# Patient Record
Sex: MALE | Race: AMERICAN INDIAN OR ALASKA NATIVE
[De-identification: names, ages, dates, MRNs, and addresses within clinical notes are randomized per-mention and may not be internally consistent; named-entity substitution may affect disease eponyms.]

---

## 2017-06-11 ENCOUNTER — HOSPITAL ENCOUNTER (EMERGENCY)
Dept: HOSPITAL 5 - ED | Age: 47
Discharge: LEFT BEFORE DISCHARGE | End: 2017-06-11
Payer: MEDICAID

## 2017-06-11 VITALS — SYSTOLIC BLOOD PRESSURE: 126 MMHG | DIASTOLIC BLOOD PRESSURE: 88 MMHG

## 2017-06-11 DIAGNOSIS — Z53.21: Primary | ICD-10-CM

## 2017-06-11 LAB
ALBUMIN SERPL-MCNC: 4.2 G/DL (ref 3.9–5)
ALBUMIN/GLOB SERPL: 1.4 %
ALP SERPL-CCNC: 88 UNITS/L (ref 35–129)
ALT SERPL-CCNC: 14 UNITS/L (ref 7–56)
ANION GAP SERPL CALC-SCNC: 16 MMOL/L
BASOPHILS NFR BLD AUTO: 0.7 % (ref 0–1.8)
BILIRUB SERPL-MCNC: < 0.2 MG/DL (ref 0.1–1.2)
BILIRUB UR QL STRIP: (no result)
BLOOD UR QL VISUAL: (no result)
BUN SERPL-MCNC: 9 MG/DL (ref 9–20)
BUN/CREAT SERPL: 11.25 %
CALCIUM SERPL-MCNC: 9.4 MG/DL (ref 8.4–10.2)
CHLORIDE SERPL-SCNC: 102.5 MMOL/L (ref 98–107)
CO2 SERPL-SCNC: 28 MMOL/L (ref 22–30)
EOSINOPHIL NFR BLD AUTO: 4.1 % (ref 0–4.3)
GLUCOSE SERPL-MCNC: 88 MG/DL (ref 75–100)
HCT VFR BLD CALC: 39.5 % (ref 35.5–45.6)
HGB BLD-MCNC: 13.1 GM/DL (ref 11.8–15.2)
KETONES UR STRIP-MCNC: (no result) MG/DL
LEUKOCYTE ESTERASE UR QL STRIP: (no result)
LIPASE SERPL-CCNC: 28 UNITS/L (ref 13–60)
MCH RBC QN AUTO: 30 PG (ref 28–32)
MCHC RBC AUTO-ENTMCNC: 33 % (ref 32–34)
MCV RBC AUTO: 91 FL (ref 84–94)
NITRITE UR QL STRIP: (no result)
PH UR STRIP: 6 [PH] (ref 5–7)
PLATELET # BLD: 254 K/MM3 (ref 140–440)
POTASSIUM SERPL-SCNC: 4.9 MMOL/L (ref 3.6–5)
PROT SERPL-MCNC: 7.2 G/DL (ref 6.3–8.2)
PROT UR STRIP-MCNC: (no result) MG/DL
RBC # BLD AUTO: 4.35 M/MM3 (ref 3.65–5.03)
RBC #/AREA URNS HPF: 4 /HPF (ref 0–6)
SODIUM SERPL-SCNC: 142 MMOL/L (ref 137–145)
URINE DRUGS OF ABUSE NOTE: (no result)
UROBILINOGEN UR-MCNC: < 2 MG/DL (ref ?–2)
WBC # BLD AUTO: 7.9 K/MM3 (ref 4.5–11)
WBC #/AREA URNS HPF: 1 /HPF (ref 0–6)

## 2017-06-11 PROCEDURE — 80053 COMPREHEN METABOLIC PANEL: CPT

## 2017-06-11 PROCEDURE — 36415 COLL VENOUS BLD VENIPUNCTURE: CPT

## 2017-06-11 PROCEDURE — 85025 COMPLETE CBC W/AUTO DIFF WBC: CPT

## 2017-06-11 PROCEDURE — 83690 ASSAY OF LIPASE: CPT

## 2017-06-11 PROCEDURE — 81001 URINALYSIS AUTO W/SCOPE: CPT

## 2017-06-11 PROCEDURE — 80320 DRUG SCREEN QUANTALCOHOLS: CPT

## 2017-06-11 PROCEDURE — G0480 DRUG TEST DEF 1-7 CLASSES: HCPCS

## 2017-06-11 PROCEDURE — 80307 DRUG TEST PRSMV CHEM ANLYZR: CPT

## 2017-08-20 ENCOUNTER — HOSPITAL ENCOUNTER (EMERGENCY)
Dept: HOSPITAL 5 - ED | Age: 47
Discharge: HOME | End: 2017-08-20
Payer: MEDICAID

## 2017-08-20 VITALS — SYSTOLIC BLOOD PRESSURE: 115 MMHG | DIASTOLIC BLOOD PRESSURE: 58 MMHG
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
## 2017-08-20 DIAGNOSIS — S02.609A: Primary | ICD-10-CM
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
## 2017-08-20 DIAGNOSIS — F17.200: ICD-10-CM
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
## 2017-08-20 PROCEDURE — 99283 EMERGENCY DEPT VISIT LOW MDM: CPT

## 2017-08-20 PROCEDURE — 70450 CT HEAD/BRAIN W/O DYE: CPT

## 2017-08-20 PROCEDURE — 70486 CT MAXILLOFACIAL W/O DYE: CPT

## 2017-08-20 PROCEDURE — 96372 THER/PROPH/DIAG INJ SC/IM: CPT
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
## 2018-01-21 ENCOUNTER — HOSPITAL ENCOUNTER (INPATIENT)
Dept: HOSPITAL 5 - ED | Age: 48
LOS: 4 days | Discharge: HOME | DRG: 855 | End: 2018-01-25
Attending: INTERNAL MEDICINE | Admitting: INTERNAL MEDICINE
Payer: SELF-PAY

## 2018-01-21 DIAGNOSIS — Z72.89: ICD-10-CM

## 2018-01-21 DIAGNOSIS — N48.21: ICD-10-CM

## 2018-01-21 DIAGNOSIS — Z79.2: ICD-10-CM

## 2018-01-21 DIAGNOSIS — A41.9: Primary | ICD-10-CM

## 2018-01-21 DIAGNOSIS — N48.22: ICD-10-CM

## 2018-01-21 DIAGNOSIS — Z82.49: ICD-10-CM

## 2018-01-21 DIAGNOSIS — Z88.0: ICD-10-CM

## 2018-01-21 DIAGNOSIS — F15.90: ICD-10-CM

## 2018-01-21 DIAGNOSIS — F17.200: ICD-10-CM

## 2018-01-21 DIAGNOSIS — Z79.899: ICD-10-CM

## 2018-01-21 LAB
BASOPHILS # (AUTO): 0.1 K/MM3 (ref 0–0.1)
BASOPHILS NFR BLD AUTO: 0.4 % (ref 0–1.8)
BILIRUB UR QL STRIP: (no result)
BLOOD UR QL VISUAL: (no result)
BUN SERPL-MCNC: 12 MG/DL (ref 9–20)
BUN/CREAT SERPL: 13 %
CALCIUM SERPL-MCNC: 9.5 MG/DL (ref 8.4–10.2)
EOSINOPHIL # BLD AUTO: 0.1 K/MM3 (ref 0–0.4)
EOSINOPHIL NFR BLD AUTO: 0.6 % (ref 0–4.3)
HCT VFR BLD CALC: 42 % (ref 35.5–45.6)
HEMOLYSIS INDEX: 12
HGB BLD-MCNC: 13.8 GM/DL (ref 11.8–15.2)
LYMPHOCYTES # BLD AUTO: 1.4 K/MM3 (ref 1.2–5.4)
LYMPHOCYTES NFR BLD AUTO: 9.6 % (ref 13.4–35)
MCH RBC QN AUTO: 30 PG (ref 28–32)
MCHC RBC AUTO-ENTMCNC: 33 % (ref 32–34)
MCV RBC AUTO: 91 FL (ref 84–94)
MONOCYTES # (AUTO): 1.3 K/MM3 (ref 0–0.8)
MONOCYTES % (AUTO): 8.6 % (ref 0–7.3)
MUCOUS THREADS #/AREA URNS HPF: (no result) /HPF
NITRITE UR QL STRIP: (no result)
PH UR STRIP: 7 [PH] (ref 5–7)
PLATELET # BLD: 270 K/MM3 (ref 140–440)
RBC # BLD AUTO: 4.61 M/MM3 (ref 3.65–5.03)
RBC #/AREA URNS HPF: 20 /HPF (ref 0–6)
UROBILINOGEN UR-MCNC: 4 MG/DL (ref ?–2)
WBC #/AREA URNS HPF: 5 /HPF (ref 0–6)

## 2018-01-21 PROCEDURE — 80048 BASIC METABOLIC PNL TOTAL CA: CPT

## 2018-01-21 PROCEDURE — 87116 MYCOBACTERIA CULTURE: CPT

## 2018-01-21 PROCEDURE — 96365 THER/PROPH/DIAG IV INF INIT: CPT

## 2018-01-21 PROCEDURE — 86695 HERPES SIMPLEX TYPE 1 TEST: CPT

## 2018-01-21 PROCEDURE — 87075 CULTR BACTERIA EXCEPT BLOOD: CPT

## 2018-01-21 PROCEDURE — 93005 ELECTROCARDIOGRAM TRACING: CPT

## 2018-01-21 PROCEDURE — 36415 COLL VENOUS BLD VENIPUNCTURE: CPT

## 2018-01-21 PROCEDURE — 96375 TX/PRO/DX INJ NEW DRUG ADDON: CPT

## 2018-01-21 PROCEDURE — 86403 PARTICLE AGGLUT ANTBDY SCRN: CPT

## 2018-01-21 PROCEDURE — 87040 BLOOD CULTURE FOR BACTERIA: CPT

## 2018-01-21 PROCEDURE — 85027 COMPLETE CBC AUTOMATED: CPT

## 2018-01-21 PROCEDURE — 87086 URINE CULTURE/COLONY COUNT: CPT

## 2018-01-21 PROCEDURE — 87400 INFLUENZA A/B EACH AG IA: CPT

## 2018-01-21 PROCEDURE — 88305 TISSUE EXAM BY PATHOLOGIST: CPT

## 2018-01-21 PROCEDURE — 74177 CT ABD & PELVIS W/CONTRAST: CPT

## 2018-01-21 PROCEDURE — 96361 HYDRATE IV INFUSION ADD-ON: CPT

## 2018-01-21 PROCEDURE — 82140 ASSAY OF AMMONIA: CPT

## 2018-01-21 PROCEDURE — 86592 SYPHILIS TEST NON-TREP QUAL: CPT

## 2018-01-21 PROCEDURE — 87186 SC STD MICRODIL/AGAR DIL: CPT

## 2018-01-21 PROCEDURE — 93975 VASCULAR STUDY: CPT

## 2018-01-21 PROCEDURE — 86140 C-REACTIVE PROTEIN: CPT

## 2018-01-21 PROCEDURE — 87806 HIV AG W/HIV1&2 ANTB W/OPTIC: CPT

## 2018-01-21 PROCEDURE — 82962 GLUCOSE BLOOD TEST: CPT

## 2018-01-21 PROCEDURE — 71045 X-RAY EXAM CHEST 1 VIEW: CPT

## 2018-01-21 PROCEDURE — 86696 HERPES SIMPLEX TYPE 2 TEST: CPT

## 2018-01-21 PROCEDURE — 96366 THER/PROPH/DIAG IV INF ADDON: CPT

## 2018-01-21 PROCEDURE — 81001 URINALYSIS AUTO W/SCOPE: CPT

## 2018-01-21 PROCEDURE — 93010 ELECTROCARDIOGRAM REPORT: CPT

## 2018-01-21 PROCEDURE — 80202 ASSAY OF VANCOMYCIN: CPT

## 2018-01-21 PROCEDURE — 72195 MRI PELVIS W/O DYE: CPT

## 2018-01-21 PROCEDURE — 85025 COMPLETE CBC W/AUTO DIFF WBC: CPT

## 2018-01-21 RX ADMIN — VANCOMYCIN HYDROCHLORIDE SCH MLS/HR: 100 INJECTION, POWDER, LYOPHILIZED, FOR SOLUTION INTRAVENOUS at 22:57

## 2018-01-21 RX ADMIN — OSELTAMIVIR PHOSPHATE SCH MG: 75 CAPSULE ORAL at 23:16

## 2018-01-21 NOTE — ULTRASOUND REPORT
FINAL REPORT



EXAM:  US TESTICULAR DOPPLER COMP



HISTORY:  testicular pain 



TECHNIQUE:  Ultrasound of scrotum



PRIORS:  None.



FINDINGS:  

Examination of the testicles demonstrates both to be normal in

size and normal and homogeneous in echogenicity with normal blood

flow bilaterally. No focal abnormality is noted in either

testicle. The right testicle measures 3.9 x 2.3 x 2.9 cm and the

left measures 4.1 x 1.5 x 2.6 cm.



Both epididymides appear normal in size and echogenicity with

normal blood flow. There is a cyst in the head of the right

epididymis measuring 1.2 cm. No evidence for hydroceles or

varicoceles are present bilaterally.



IMPRESSION:  

Small cyst in the head of the right epididymis. Otherwise, normal

testicular ultrasound

## 2018-01-21 NOTE — HISTORY AND PHYSICAL REPORT
History of Present Illness


Date of examination: 01/21/18


History of present illness: 


47-year-old man with no medical problems comes emergency room because he stated 

he developed a pimple on the penis 2 days ago, he picked it and has now 

developed pain,swelling,  fever, chills.  He also complained of body ache


Review Of  Systems:


Constitutional: no weight loss


Ears, eyes, nose, mouth and throat: no nasal congestion, no nasal discharge, no 

sinus pressure, blurry vision, diplopia


Neck: No neck pain or rigidity.


Cardiovascular: No chest pain,  palpitations


Respiratory: No shortness of breath, cough


Gastrointestinal: No abdominal pain, hematochezia


Genitourinary : no dysuria, frequency , hematuria


Musculoskeletal: no muscle ache 


Integumentary: no rash, no pruritis


Neurological: no parathesias, focal weakness


Endocrine: no cold or heat intolerance, no polyuria or polydipsia


Hematologic/Lymphatic: no easy bruising, no easy bleeding, no gland swelling


Allergic/Immunologic: no urticaria, no angioedema.





PAST MEDICAL HISTORY:none





PAST SURGICAL HISTORY:none





FAMILY HISTORY: Hypertension





SOCIAL HISTORY: Smokes half pack a day, methamphetamine use, positive alcohol 

use














Medications and Allergies


 Allergies











Allergy/AdvReac Type Severity Reaction Status Date / Time


 


Penicillins Allergy  Hives Verified 12/17/13 22:28











 Home Medications











 Medication  Instructions  Recorded  Confirmed  Last Taken  Type


 


Ciprofloxacin HCl [Cipro] 500 mg PO Q12H #14 tab 12/18/13  Unknown Rx


 


HYDROcodone/APAP 5-325 [[place] 1 each PO Q6HR PRN #14 tablet 08/20/17  Unknown Rx





5-325 mg TAB]     


 


Sulfamethoxazole/Trimethoprim 1 each PO BID #14 tablet 08/20/17  Unknown Rx





[Bactrim DS TAB]     











Active Meds: 


Active Medications





Vancomycin HCl 1,500 mg/ (Sodium Chloride)  515 mls @ 257.5 mls/hr IV Q12H JOHN


Oseltamivir Phosphate (Tamiflu)  75 mg PO BID JOHN


   Stop: 01/26/18 10:01


Vancomycin HCl (Vancomycin Pharmacy To Dose)  1 each IV PKCONSULT JOHN


   PRN Reason: Protocol











Exam





- Physical Exam


Narrative exam: 


Gen. appearance: Patient lying in bed in no acute distress


HEENT: Normocephalic/atraumatic, pupils equal round reactive to light, extra 

occular movement intact, no scleral icterus, no JVD or thyromegaly or nodule, 

neck is supple, mucous membrane moist, no erythema or exudate


Heart: S1-S2, regular rate and rhythm


Lungs: Clear to auscultation bilateral breathing comfortable


Abdomen: Positive bowel sounds, nontender, nondistended, no organomegaly


Extremities: No edema, cyanosis, clubbing


Neuro:: Oriented 3 , cranial nerves II-12 intact, speech, motor intact


Skin: No rash, nodules, warm dry








- Constitutional


Vitals: 


 











Temp Pulse Resp BP Pulse Ox


 


 101.7 F H  138 H  18   132/81   99 


 


 01/21/18 18:07  01/21/18 18:07  01/21/18 18:15  01/21/18 18:07  01/21/18 18:07














Results





- Labs


CBC & Chem 7: 


 01/21/18 18:16





 01/21/18 18:16


Labs: 


 Abnormal lab results











  01/21/18 01/21/18 Range/Units





  18:16 18:16 


 


WBC  15.1 H   (4.5-11.0)  K/mm3


 


Lymph % (Auto)  9.6 L   (13.4-35.0)  %


 


Mono % (Auto)  8.6 H   (0.0-7.3)  %


 


Mono #  1.3 H   (0.0-0.8)  K/mm3


 


Seg Neutrophils %  80.8 H   (40.0-70.0)  %


 


Seg Neutrophils #  12.2 H   (1.8-7.7)  K/mm3


 


Sodium   134 L  (137-145)  mmol/L


 


Chloride   95.5 L  ()  mmol/L














- Imaging and Cardiology


CT scan - abdomen: report reviewed


CT scan - pelvis: report reviewed





Assessment and Plan


testicular US reviewed
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
## 2018-01-21 NOTE — CAT SCAN REPORT
FINAL REPORT



PROCEDURE:  CT ABDOMEN PELVIS W CON



TECHNIQUE:  Computerized axial tomography of the abdomen and

pelvis was performed after the IV injection of iodinated nonionic

contrast. 



HISTORY:  Fever/Sepsis 



COMPARISON:  No prior studies are available for comparison.



FINDINGS:  

6 millimeter x 4 millimeter well-defined cystic lesion is noted

involving segment 5 of right lobe liver. There are multiple

additional smaller hypodense lesions measuring 3-4 millimeters

which cannot be further evaluated. Spleen, and adrenal glands are

within normal limits. Bilateral kidneys demonstrate uniform

enhancement without hydronephrosis aorta is of normal caliber.

There is no free fluid or free air. Gallbladder is unremarkable.

Small bowel loops are within normal limits. Mild degree residual

stool is noted. Appendix is partially visualized and is normal. 



IMPRESSION:  

Small cystic lesion right lobe liver. Is no smaller hypodense

lesions are too small to characterize.



Otherwise no acute intra-abdominal or pelvic pathology.

## 2018-01-22 LAB
BUN SERPL-MCNC: 13 MG/DL (ref 9–20)
BUN/CREAT SERPL: 13 %
CALCIUM SERPL-MCNC: 9.3 MG/DL (ref 8.4–10.2)
HCT VFR BLD CALC: 42.5 % (ref 35.5–45.6)
HEMOLYSIS INDEX: 6
HGB BLD-MCNC: 13.8 GM/DL (ref 11.8–15.2)
MCH RBC QN AUTO: 30 PG (ref 28–32)
MCHC RBC AUTO-ENTMCNC: 33 % (ref 32–34)
MCV RBC AUTO: 91 FL (ref 84–94)
PLATELET # BLD: 250 K/MM3 (ref 140–440)
RBC # BLD AUTO: 4.67 M/MM3 (ref 3.65–5.03)

## 2018-01-22 RX ADMIN — ENOXAPARIN SODIUM SCH MG: 100 INJECTION SUBCUTANEOUS at 10:05

## 2018-01-22 RX ADMIN — OXYCODONE AND ACETAMINOPHEN PRN TAB: 5; 325 TABLET ORAL at 10:44

## 2018-01-22 RX ADMIN — NICOTINE SCH MG: 14 PATCH TRANSDERMAL at 12:57

## 2018-01-22 RX ADMIN — OXYCODONE AND ACETAMINOPHEN PRN TAB: 5; 325 TABLET ORAL at 05:04

## 2018-01-22 RX ADMIN — LEVOFLOXACIN SCH MLS/HR: 5 INJECTION, SOLUTION INTRAVENOUS at 10:05

## 2018-01-22 RX ADMIN — VANCOMYCIN HYDROCHLORIDE SCH MLS/HR: 100 INJECTION, POWDER, LYOPHILIZED, FOR SOLUTION INTRAVENOUS at 10:04

## 2018-01-22 RX ADMIN — OSELTAMIVIR PHOSPHATE SCH MG: 75 CAPSULE ORAL at 10:05

## 2018-01-22 RX ADMIN — DOXYCYCLINE HYCLATE SCH MG: 100 CAPSULE ORAL at 23:22

## 2018-01-22 RX ADMIN — VANCOMYCIN HYDROCHLORIDE SCH MLS/HR: 100 INJECTION, POWDER, LYOPHILIZED, FOR SOLUTION INTRAVENOUS at 23:21

## 2018-01-22 RX ADMIN — OXYCODONE AND ACETAMINOPHEN PRN TAB: 5; 325 TABLET ORAL at 20:00

## 2018-01-22 RX ADMIN — OXYCODONE AND ACETAMINOPHEN PRN TAB: 5; 325 TABLET ORAL at 14:07

## 2018-01-22 RX ADMIN — OSELTAMIVIR PHOSPHATE SCH MG: 75 CAPSULE ORAL at 23:22

## 2018-01-22 NOTE — PROGRESS NOTE
Assessment and Plan





?? chancrois or LGV 


on IV abs 


ID consult noted


rec mri and poss drainage  pensing 


all explained 


dictated 





Subjective


Date of service: 01/22/18


Principal diagnosis: penile lesion edema 





Objective





- Constitutional


Vitals: 


 Vital Signs - 12hr











  01/22/18 01/22/18 01/22/18





  08:57 10:44 12:26


 


Temperature 98.4 F  100.4 F H


 


Pulse Rate 83  92 H


 


Respiratory 16 20 18





Rate   


 


Blood Pressure 96/52  94/55


 


O2 Sat by Pulse 97  97





Oximetry   














  01/22/18 01/22/18





  14:07 15:50


 


Temperature  99.0 F


 


Pulse Rate  89


 


Respiratory 20 18





Rate  


 


Blood Pressure  97/54


 


O2 Sat by Pulse  93





Oximetry  











General appearance: Present: mild distress





- Neck


Neck: supple





- Respiratory


Respiratory effort: normal





- Gastrointestinal


General gastrointestinal: Present: soft, non-tender





- Genitourinary


Male genitourinary: right inguinal lymphadenopathy, left inguinal 

lymphadenopathy, asymmetrical





- Labs


CBC & Chem 7: 


 01/22/18 04:00





 01/22/18 04:00


Labs: 


 Abnormal lab results











  01/21/18 01/21/18 01/22/18 Range/Units





  18:16 18:16 04:00 


 


WBC  15.1 H   14.4 H  (4.5-11.0)  K/mm3


 


Lymph % (Auto)  9.6 L    (13.4-35.0)  %


 


Mono % (Auto)  8.6 H    (0.0-7.3)  %


 


Mono #  1.3 H    (0.0-0.8)  K/mm3


 


Seg Neutrophils %  80.8 H    (40.0-70.0)  %


 


Seg Neutrophils #  12.2 H    (1.8-7.7)  K/mm3


 


Sodium   134 L   (137-145)  mmol/L


 


Chloride   95.5 L   ()  mmol/L


 


Glucose     ()  mg/dL


 


POC Glucose     ()  














  01/22/18 01/22/18 Range/Units





  04:00 07:24 


 


WBC    (4.5-11.0)  K/mm3


 


Lymph % (Auto)    (13.4-35.0)  %


 


Mono % (Auto)    (0.0-7.3)  %


 


Mono #    (0.0-0.8)  K/mm3


 


Seg Neutrophils %    (40.0-70.0)  %


 


Seg Neutrophils #    (1.8-7.7)  K/mm3


 


Sodium    (137-145)  mmol/L


 


Chloride  95.0 L   ()  mmol/L


 


Glucose  31 L*   ()  mg/dL


 


POC Glucose   113 H  ()

## 2018-01-22 NOTE — PROGRESS NOTE
<SHARONA NI - Last Filed: 01/22/18 14:21>





Assessment and Plan


Assessment and plan: 





This is a 47-year-old male who was previously on known to this provider, patient

's does not have a local primary care doctor, and endorses a past history of IV 

methamphetamine abuse within the past month.  Patient reports that a few days 

ago he had a sore on the dorsal aspect of his penis, and "picked it off."  

Shortly thereafter developed diffuse penile pain and swelling, left-sided 

testicular pain, right lower quadrant pain and right lower back pain.  He 

endorses fevers, chills and body aches.  He thinks that he has the flu.  He 

denies severe headache, neck pain, chest pain, shortness of breath, irritative 

urinary symptoms.  His pain is constant, does not radiate anywhere, it 

increases with palpation and decreases with rest











- Patient Problems


(1) Penile cellulitis


Current Visit: Yes   Status: Acute   


Plan to address problem: 


Urology consulted, continue abx








(2) Sepsis affecting skin


Current Visit: Yes   Status: Acute   


Plan to address problem: 


Improving, continue antibiotics, ID consulted








(3) Current nicotine use


Current Visit: Yes   Status: Chronic   


Plan to address problem: 


Patient counseled on cessation, nicotine patch ordered








(4) DVT prophylaxis


Current Visit: Yes   Status: Acute   


Plan to address problem: 


Lovenox








History


Interval history: 





Patient was seen and examined.  He continues to complain of penile pain.  

Nursing notes, labs and imaging reviewed.





Hospitalist Physical





- Physical exam


Narrative exam: 





the penile shaft is diffusely swollen, tender and indurated. 





- Constitutional


Vitals: 


 











Temp Pulse Resp BP Pulse Ox


 


 98.5 F   92 H  20   92/51   96 


 


 01/22/18 03:59  01/22/18 03:59  01/22/18 03:59  01/22/18 03:59  01/22/18 03:59











General appearance: Present: mild distress, well-nourished





- EENT


Eyes: Present: PERRL, EOM intact


ENT: hearing intact, clear oral mucosa





- Neck


Neck: Present: supple, normal ROM





- Respiratory


Respiratory effort: normal


Respiratory: bilateral: CTA





- Cardiovascular


Rhythm: regular


Heart Sounds: Present: S1 & S2





- Extremities


Extremities: no ischemia, No edema





- Abdominal


General gastrointestinal: soft, non-tender, non-distended





- Integumentary


Integumentary: Present: clear, warm, dry





- Psychiatric


Psychiatric: appropriate mood/affect, cooperative





- Neurologic


Neurologic: CNII-XII intact, moves all extremities





- Allied Health


Allied health notes reviewed: nursing





Results





- Labs


CBC & Chem 7: 


 01/22/18 04:00





 01/22/18 04:00


Labs: 


 Laboratory Last Values











WBC  14.4 K/mm3 (4.5-11.0)  H  01/22/18  04:00    


 


RBC  4.67 M/mm3 (3.65-5.03)   01/22/18  04:00    


 


Hgb  13.8 gm/dl (11.8-15.2)   01/22/18  04:00    


 


Hct  42.5 % (35.5-45.6)   01/22/18  04:00    


 


MCV  91 fl (84-94)   01/22/18  04:00    


 


MCH  30 pg (28-32)   01/22/18  04:00    


 


MCHC  33 % (32-34)   01/22/18  04:00    


 


RDW  14.1 % (13.2-15.2)   01/22/18  04:00    


 


Plt Count  250 K/mm3 (140-440)   01/22/18  04:00    


 


Lymph % (Auto)  Np   01/22/18  04:00    


 


Mono % (Auto)  Np   01/22/18  04:00    


 


Eos % (Auto)  Np   01/22/18  04:00    


 


Baso % (Auto)  Np   01/22/18  04:00    


 


Lymph #  Np   01/22/18  04:00    


 


Mono #  Np   01/22/18  04:00    


 


Eos #  Np   01/22/18  04:00    


 


Baso #  Np   01/22/18  04:00    


 


Seg Neutrophils %  Np   01/22/18  04:00    


 


Seg Neutrophils #  Np   01/22/18  04:00    


 


Sodium  138 mmol/L (137-145)   01/22/18  04:00    


 


Potassium  3.9 mmol/L (3.6-5.0)   01/22/18  04:00    


 


Chloride  95.0 mmol/L ()  L  01/22/18  04:00    


 


Carbon Dioxide  23 mmol/L (22-30)   01/22/18  04:00    


 


Anion Gap  24 mmol/L  01/22/18  04:00    


 


BUN  13 mg/dL (9-20)   01/22/18  04:00    


 


Creatinine  1.0 mg/dL (0.8-1.5)   01/22/18  04:00    


 


Estimated GFR  > 60 ml/min  01/22/18  04:00    


 


BUN/Creatinine Ratio  13 %  01/22/18  04:00    


 


Glucose  31 mg/dL ()  L*  01/22/18  04:00    


 


POC Glucose  113  ()  H  01/22/18  07:24    


 


Lactic Acid  0.90 mmol/L (0.7-2.0)   01/21/18  23:48    


 


Calcium  9.3 mg/dL (8.4-10.2)   01/22/18  04:00    


 


Urine Color  Yellow  (Yellow)   01/21/18  18:18    


 


Urine Turbidity  Clear  (Clear)   01/21/18  18:18    


 


Urine pH  7.0  (5.0-7.0)   01/21/18  18:18    


 


Ur Specific Gravity  1.024  (1.003-1.030)   01/21/18  18:18    


 


Urine Protein  30 mg/dl mg/dL (Negative)   01/21/18  18:18    


 


Urine Glucose (UA)  Neg mg/dL (Negative)   01/21/18  18:18    


 


Urine Ketones  20 mg/dL (Negative)   01/21/18  18:18    


 


Urine Blood  Neg  (Negative)   01/21/18  18:18    


 


Urine Nitrite  Neg  (Negative)   01/21/18  18:18    


 


Urine Bilirubin  Neg  (Negative)   01/21/18  18:18    


 


Urine Urobilinogen  4.0 mg/dL (<2.0)   01/21/18  18:18    


 


Ur Leukocyte Esterase  Neg  (Negative)   01/21/18  18:18    


 


Urine WBC (Auto)  5.0 /HPF (0.0-6.0)   01/21/18  18:18    


 


Urine RBC (Auto)  20.0 /HPF (0.0-6.0)   01/21/18  18:18    


 


U Epithel Cells (Auto)  1.0 /HPF (0-13.0)   01/21/18  18:18    


 


Urine Mucus  Few /HPF  01/21/18  18:18    














<NATASHA PINEDO - Last Filed: 01/22/18 15:32>





Assessment and Plan


Assessment and plan: 





I saw and evaluated the patient. I agree with the findings and the plan of care 

as documented in the Nurse Practitioner's~note, with the following corrections 

and additions.


Patient with penile edema, cellulitis. Continue antibiotics. Consult Urology 

and ID Physician.








Hospitalist Physical





- Constitutional


Vitals: 


 











Temp Pulse Resp BP Pulse Ox


 


 98.4 F   83   20   96/52   97 


 


 01/22/18 08:57  01/22/18 08:57  01/22/18 14:07  01/22/18 08:57  01/22/18 08:57














Results





- Labs


CBC & Chem 7: 


 01/22/18 04:00





 01/22/18 04:00


Labs: 


 Laboratory Last Values











WBC  14.4 K/mm3 (4.5-11.0)  H  01/22/18  04:00    


 


RBC  4.67 M/mm3 (3.65-5.03)   01/22/18  04:00    


 


Hgb  13.8 gm/dl (11.8-15.2)   01/22/18  04:00    


 


Hct  42.5 % (35.5-45.6)   01/22/18  04:00    


 


MCV  91 fl (84-94)   01/22/18  04:00    


 


MCH  30 pg (28-32)   01/22/18  04:00    


 


MCHC  33 % (32-34)   01/22/18  04:00    


 


RDW  14.1 % (13.2-15.2)   01/22/18  04:00    


 


Plt Count  250 K/mm3 (140-440)   01/22/18  04:00    


 


Lymph % (Auto)  Np   01/22/18  04:00    


 


Mono % (Auto)  Np   01/22/18  04:00    


 


Eos % (Auto)  Np   01/22/18  04:00    


 


Baso % (Auto)  Np   01/22/18  04:00    


 


Lymph #  Np   01/22/18  04:00    


 


Mono #  Np   01/22/18  04:00    


 


Eos #  Np   01/22/18  04:00    


 


Baso #  Np   01/22/18  04:00    


 


Seg Neutrophils %  Np   01/22/18  04:00    


 


Seg Neutrophils #  Np   01/22/18  04:00    


 


Sodium  138 mmol/L (137-145)   01/22/18  04:00    


 


Potassium  3.9 mmol/L (3.6-5.0)   01/22/18  04:00    


 


Chloride  95.0 mmol/L ()  L  01/22/18  04:00    


 


Carbon Dioxide  23 mmol/L (22-30)   01/22/18  04:00    


 


Anion Gap  24 mmol/L  01/22/18  04:00    


 


BUN  13 mg/dL (9-20)   01/22/18  04:00    


 


Creatinine  1.0 mg/dL (0.8-1.5)   01/22/18  04:00    


 


Estimated GFR  > 60 ml/min  01/22/18  04:00    


 


BUN/Creatinine Ratio  13 %  01/22/18  04:00    


 


Glucose  31 mg/dL ()  L*  01/22/18  04:00    


 


POC Glucose  113  ()  H  01/22/18  07:24    


 


Lactic Acid  0.90 mmol/L (0.7-2.0)   01/21/18  23:48    


 


Calcium  9.3 mg/dL (8.4-10.2)   01/22/18  04:00    


 


Urine Color  Yellow  (Yellow)   01/21/18  18:18    


 


Urine Turbidity  Clear  (Clear)   01/21/18  18:18    


 


Urine pH  7.0  (5.0-7.0)   01/21/18  18:18    


 


Ur Specific Gravity  1.024  (1.003-1.030)   01/21/18  18:18    


 


Urine Protein  30 mg/dl mg/dL (Negative)   01/21/18  18:18    


 


Urine Glucose (UA)  Neg mg/dL (Negative)   01/21/18  18:18    


 


Urine Ketones  20 mg/dL (Negative)   01/21/18  18:18    


 


Urine Blood  Neg  (Negative)   01/21/18  18:18    


 


Urine Nitrite  Neg  (Negative)   01/21/18  18:18    


 


Urine Bilirubin  Neg  (Negative)   01/21/18  18:18    


 


Urine Urobilinogen  4.0 mg/dL (<2.0)   01/21/18  18:18    


 


Ur Leukocyte Esterase  Neg  (Negative)   01/21/18  18:18    


 


Urine WBC (Auto)  5.0 /HPF (0.0-6.0)   01/21/18  18:18    


 


Urine RBC (Auto)  20.0 /HPF (0.0-6.0)   01/21/18  18:18    


 


U Epithel Cells (Auto)  1.0 /HPF (0-13.0)   01/21/18  18:18    


 


Urine Mucus  Few /HPF  01/21/18  18:18

## 2018-01-22 NOTE — CONSULTATION
History of Present Illness





- Reason for Consult


Consult date: 01/22/18


sepsis


Requesting physician: SHARONA ARMAS





- History of Present Illness


47 years old male with history of IV methamphetamine abuse within the past month

; admitted on 01/21/2019 due to 48 hours history of severe penile swelling, 

tenderness and erythema.  Patient reports that today before admission, he noted 

a bump on the dorsal aspect of his penile shaft.  Denies any recent trauma or 

injury.  He is sexually active with 2 females, condom and uses is consistent.  

He reports that the same day he started to picked on the pump to make it 

drained.  He did not get any purulence out.  However, 12 hours later he noted 

increased erythema and edema and tenderness of the area.  She also noted nausea 

and malaise with chills.  Denies vomiting or diarrhea. He also noted severe 

frontal headache. No cough. 





In the emergency room, initial temperature was 101.7, heart rate 138, 

respiration 14, O2 sat 99, blood pressure 132/81.  Initial white count 15.1.  

Hemoglobin 13.8.  Platelets 270.  Creatinine is 0.9.  Lactic acid 0.9.  

Urinalysis was negative.





Microbiology:





Blood cultures:


1/21 ngtd





Influenza: neg





Current Antimicrobials:


Tamiflu


Levaquin


Vancomycin


 


Previous Antimicrobials:








 





Past History


Past Surgical History: No surgical history


Social history: no significant social history, single, smoking, IV drug use


Family history: no significant family history





Medications and Allergies


 Allergies











Allergy/AdvReac Type Severity Reaction Status Date / Time


 


Penicillins Allergy  Hives Verified 12/17/13 22:28











 Home Medications











 Medication  Instructions  Recorded  Confirmed  Last Taken  Type


 


No Known Home Medications [No  01/22/18 01/22/18 Unknown History





Reported Home Medications]     











Active Meds: 


Active Medications





Acetaminophen (Tylenol)  650 mg PO Q4H PRN


   PRN Reason: Pain MILD(1-3)/Fever >100.5/HA


Bisacodyl (Dulcolax)  10 mg ID QDAY PRN


   PRN Reason: Constipation unrelieved by MOM


Enoxaparin Sodium (Lovenox)  40 mg SUB-Q QDAY@1000 [facility]


   Last Admin: 01/22/18 10:05 Dose:  40 mg


Vancomycin HCl 1,500 mg/ (Sodium Chloride)  515 mls @ 257.5 mls/hr IV Q12H [facility]


   Last Admin: 01/22/18 10:04 Dose:  257.5 mls/hr


Sodium Chloride (Nacl 0.9% 1000 Ml)  1,000 mls @ 150 mls/hr IV AS DIRECT JOHN


Levofloxacin/Dextrose (Levaquin 750mg/150ml)  750 mg in 150 mls @ 100 mls/hr IV 

Q24HR JOHN


   PRN Reason: Protocol


   Last Admin: 01/22/18 10:05 Dose:  100 mls/hr


Magnesium Hydroxide (Milk Of Magnesia)  30 ml PO Q4H PRN


   PRN Reason: Constipation


Nicotine (Habitrol)  14 mg TD QDAY [facility]


   Last Admin: 01/22/18 12:57 Dose:  14 mg


Ondansetron HCl (Zofran)  4 mg IV Q4H PRN


   PRN Reason: N/V unrelieved by Reglan


   Last Admin: 01/21/18 23:16 Dose:  4 mg


Oseltamivir Phosphate (Tamiflu)  75 mg PO BID [facility]


   Stop: 01/26/18 10:01


   Last Admin: 01/22/18 10:05 Dose:  75 mg


Oxycodone/Acetaminophen (Percocet 5/325)  1 tab PO Q4H PRN


   PRN Reason: Pain, Moderate (4-6)


   Last Admin: 01/22/18 14:07 Dose:  1 tab


Vancomycin HCl (Vancomycin Pharmacy To Dose)  1 each IV PKCONSULT JOHN


   PRN Reason: Protocol











Review of Systems


All systems: negative (as per HPI rest neg)





Physical Examination





- Physical Exam


Narrative exam: 


Assessment: 


1) Sepsis: Present on admission, manifested by fever, tachycardia,  

leukocytosis.  Etiology most likely penile cellulitis / abscess, less likely 

influenza


2) Penile cellulitis / early abscess: initial lesion unclear etiology ? STD 

versus folliculitis


3) IV amphetamine user 





Plan:


-Urology eval


-follow-up blood cultures 


-obtain C-reactive protein (CRP)


-check influenza antigen PCR in nasopharinx 


-continue vanco, zosyn and tamiflu


-check HIV, RPR, GC and chlamydia 


-add doxycycline 





Thank you MIKAELA Armas for your consultation, will follow up with you. 





Cathy Handy MD


Infectious Diseases Specialist


[facility] ([facility])


M [phone]


O [phone]











- Constitutional


Vitals: 


 Vital Signs











Temp Pulse Resp BP Pulse Ox


 


 99.0 F   89   18   97/54   93 


 


 01/22/18 15:50  01/22/18 15:50  01/22/18 15:50  01/22/18 15:50  01/22/18 15:50








 Temperature -Last 24 Hours











Temperature                    99.0 F


 


Temperature                    100.4 F


 


Temperature                    98.4 F


 


Temperature                    98.5 F


 


Temperature                    102.2 F


 


Temperature                    101.7 F

















Results





- Labs


CBC & Chem 7: 


 01/22/18 04:00





 01/22/18 04:00


Labs: 


 Abnormal lab results











  01/21/18 01/21/18 01/22/18 Range/Units





  18:16 18:16 04:00 


 


WBC  15.1 H   14.4 H  (4.5-11.0)  K/mm3


 


Lymph % (Auto)  9.6 L    (13.4-35.0)  %


 


Mono % (Auto)  8.6 H    (0.0-7.3)  %


 


Mono #  1.3 H    (0.0-0.8)  K/mm3


 


Seg Neutrophils %  80.8 H    (40.0-70.0)  %


 


Seg Neutrophils #  12.2 H    (1.8-7.7)  K/mm3


 


Sodium   134 L   (137-145)  mmol/L


 


Chloride   95.5 L   ()  mmol/L


 


Glucose     ()  mg/dL


 


POC Glucose     ()  














  01/22/18 01/22/18 Range/Units





  04:00 07:24 


 


WBC    (4.5-11.0)  K/mm3


 


Lymph % (Auto)    (13.4-35.0)  %


 


Mono % (Auto)    (0.0-7.3)  %


 


Mono #    (0.0-0.8)  K/mm3


 


Seg Neutrophils %    (40.0-70.0)  %


 


Seg Neutrophils #    (1.8-7.7)  K/mm3


 


Sodium    (137-145)  mmol/L


 


Chloride  95.0 L   ()  mmol/L


 


Glucose  31 L*   ()  mg/dL


 


POC Glucose   113 H  ()

## 2018-01-23 LAB
BUN SERPL-MCNC: 6 MG/DL (ref 9–20)
BUN/CREAT SERPL: 8 %
CALCIUM SERPL-MCNC: 8.5 MG/DL (ref 8.4–10.2)
HCT VFR BLD CALC: 39 % (ref 35.5–45.6)
HEMOLYSIS INDEX: 0
HGB BLD-MCNC: 12.5 GM/DL (ref 11.8–15.2)
MCH RBC QN AUTO: 31 PG (ref 28–32)
MCHC RBC AUTO-ENTMCNC: 34 % (ref 32–34)
MCV RBC AUTO: 90 FL (ref 84–94)
PLATELET # BLD: 208 K/MM3 (ref 140–440)
RBC # BLD AUTO: 4.01 M/MM3 (ref 3.65–5.03)

## 2018-01-23 RX ADMIN — ACYCLOVIR SODIUM SCH MLS/HR: 500 INJECTION, SOLUTION INTRAVENOUS at 21:02

## 2018-01-23 RX ADMIN — OXYCODONE AND ACETAMINOPHEN PRN TAB: 5; 325 TABLET ORAL at 07:14

## 2018-01-23 RX ADMIN — OSELTAMIVIR PHOSPHATE SCH MG: 75 CAPSULE ORAL at 12:12

## 2018-01-23 RX ADMIN — VANCOMYCIN HYDROCHLORIDE SCH MLS/HR: 100 INJECTION, POWDER, LYOPHILIZED, FOR SOLUTION INTRAVENOUS at 11:56

## 2018-01-23 RX ADMIN — NICOTINE SCH MG: 14 PATCH TRANSDERMAL at 12:12

## 2018-01-23 RX ADMIN — OXYCODONE AND ACETAMINOPHEN PRN TAB: 5; 325 TABLET ORAL at 02:49

## 2018-01-23 RX ADMIN — DOXYCYCLINE HYCLATE SCH MG: 100 CAPSULE ORAL at 21:02

## 2018-01-23 RX ADMIN — DOXYCYCLINE HYCLATE SCH MG: 100 CAPSULE ORAL at 12:13

## 2018-01-23 RX ADMIN — MORPHINE SULFATE PRN MG: 2 INJECTION, SOLUTION INTRAMUSCULAR; INTRAVENOUS at 21:01

## 2018-01-23 RX ADMIN — LEVOFLOXACIN SCH MLS/HR: 5 INJECTION, SOLUTION INTRAVENOUS at 12:05

## 2018-01-23 RX ADMIN — MORPHINE SULFATE PRN MG: 2 INJECTION, SOLUTION INTRAMUSCULAR; INTRAVENOUS at 14:54

## 2018-01-23 RX ADMIN — ACYCLOVIR SODIUM SCH MLS/HR: 500 INJECTION, SOLUTION INTRAVENOUS at 12:13

## 2018-01-23 RX ADMIN — ENOXAPARIN SODIUM SCH MG: 100 INJECTION SUBCUTANEOUS at 12:13

## 2018-01-23 RX ADMIN — SODIUM CHLORIDE, SODIUM LACTATE, POTASSIUM CHLORIDE, AND CALCIUM CHLORIDE SCH MLS/HR: .6; .31; .03; .02 INJECTION, SOLUTION INTRAVENOUS at 18:02

## 2018-01-23 RX ADMIN — VANCOMYCIN HYDROCHLORIDE SCH MLS/HR: 100 INJECTION, POWDER, LYOPHILIZED, FOR SOLUTION INTRAVENOUS at 23:18

## 2018-01-23 RX ADMIN — SODIUM CHLORIDE, SODIUM LACTATE, POTASSIUM CHLORIDE, AND CALCIUM CHLORIDE SCH MLS/HR: .6; .31; .03; .02 INJECTION, SOLUTION INTRAVENOUS at 16:50

## 2018-01-23 RX ADMIN — OXYCODONE AND ACETAMINOPHEN PRN TAB: 5; 325 TABLET ORAL at 18:02

## 2018-01-23 NOTE — CONSULTATION
HISTORY OF PRESENT ILLNESS:  The patient is a 47-year-old gentleman with history

of IV amphetamine abuse who has a severe swelling in the dorsum of his penis and

induration.  He has some significant adenopathy.  He has at least 2 partners

sexually.  He denies any recent venereal disease, but the swelling is bad and

tender.  He has been circumcised in the past.



PAST MEDICAL HISTORY:  As mentioned above.



PAST SURGICAL HISTORY:  Facial surgery.



SOCIAL HISTORY:  History of drug use.



MEDICATIONS:  Bactrim.



ALLERGIES:  PENICILLIN.



REVIEW OF SYSTEMS:  A 3-4 days of the swelling that has progressed.



PHYSICAL EXAMINATION:

GENERAL:  Awake, alert, he is in moderate discomfort.

ABDOMEN:  Soft, nondistended.

GENITALIA:  He has got bilateral 1 cm adenopathy.  In his penis, he has got an

ulcer mid shaft, circumcised with severe distal edema and induration proximally

extending to both groins.  There is no fluctuation.  Testes descended

bilaterally.

RECTAL:  Digital rectal exam, no localized masses, 10-15 gram prostate, no

nodules.



IMPRESSION:  Rule out Chancroid or lymphogranuloma venereum or local trauma or

bite.  He needs IV antibiotics and possible exploration.  We will get an MRI of

the penis.





DD: 01/22/2018 17:44

DT: 01/23/2018 05:50

JOB# [id number]  [id number]

ERNESTO/JOVAN

## 2018-01-23 NOTE — MAGNETIC RESONANCE REPORT
FINAL REPORT



PROCEDURE:  MRI pelvis including the penis without contrast. 



TECHNIQUE:  Magnetic resonance imaging of the pelvis was

performed using standard sequences. CPT 04116



HISTORY:  Possible abscess. 



COMPARISON:  CT abdomen and pelvis 01/21/2018.



FINDINGS:  

The girth of the penis may have decreased since the previous CT

scan. There are no focal fluid collections within the penis to

suggest an abscess. There are no abnormal fluid collections

within the pelvis to suggest an abscess. There is no fluid within

the scrotum. The bladder, seminal vesicles and prostate are

unremarkable. The pelvic musculature and bones have normal signal

intensity. There are numerous bilateral inguinal lymph nodes. The

largest lymph node is in the left inguinal region measuring 2.2

centimeters x 1.4 centimeters in cross-section. 



IMPRESSION:  

Numerous bilateral inguinal lymph nodes. No evidence of an

abscess in the penis nor pelvis.

## 2018-01-23 NOTE — PROGRESS NOTE
Assessment and Plan


Assessment: 


1) Sepsis:still fever, leukocytosis better.  Etiology most likely penile 

cellulitis / abscess, less likely influenza. CRP=9.9


2) Penile cellulitis / early abscess / painful penile lesion: initial lesion 

unclear etiology ? HSV (given partner positive for HSV) with superimposed 

bacterial infection versus folliculitis versus LVG (doubt since LVG is painless

) versus Chancroid (it is painful but rare in [place])


3) IV amphetamine user 


4) Cough: CXR neg. Influenza Ag neg





Plan:


-penile MRI 


-please obtain ulcer cultures and specimen for HSV PCR


-start IV acyclovir 


-continue vanco, zosyn, doxy and tamiflu


-f/u HIV, RPR, GC and chlamydia 





Thank you MIKAELA Armas for your consultation, will follow up with you. 





Cathy Handy MD


Infectious Diseases Specialist


[facility] ([facility])


M [phone]


O [phone]











Subjective


Date of service: 01/23/18


Principal diagnosis: penile lesion edema 


Interval history: 


Still fever 102, feels some better, slightly decreased of penile edema.








Microbiology:





Blood cultures:


1/21 ngtd





Influenza: neg





Current Antimicrobials:


Tamiflu


Levaquin


Vancomycin


Doxy


 


Previous Antimicrobials:











Objective





- Exam


Narrative Exam: 


General appearance: Alert in NAD, conversant 


Eyes: anicteric sclerae, moist conjunctivae; no lid-lag; PERRLA 


HENT: Atraumatic; oropharynx clear  


Neck: Trachea midline; supple, no thyromegaly or lymphadenopathy 


Lungs: CTA 


CV: RRR, no murmurs


Abdomen: Soft, non-tender; no masses or hepatosplenomegaly


Extremities: No peripheral edema or extremity lymphadenopathy


Skin: Normal temperature, turgor and texture; no rash, ulcers or subcutaneous 

nodules 


Gen: marked penile edema and mid shaft ulcerations with central necrosis no 

purulence seen 


Psych: Appropriate affect, alert and oriented to person, place and time. Neuro: 

alert and oriented x 3. Moving all extermities


Lines: No CVL / PICC














- Constitutional


Vitals: 


 Vital Signs











Temp Pulse Resp BP Pulse Ox


 


 98.8 F   82   18   85/52   93 


 


 01/23/18 07:19  01/23/18 07:19  01/23/18 07:19  01/23/18 07:19  01/23/18 07:19








 Temperature -Last 24 Hours











Temperature                    98.8 F


 


Temperature                    102.7 F


 


Temperature                    101.0 F


 


Temperature                    99.0 F


 


Temperature                    100.4 F

















- Labs


CBC & Chem 7: 


 01/23/18 05:15





 01/23/18 05:15


Labs: 


 Abnormal lab results











  01/22/18 01/23/18 Range/Units





  Unknown 05:15 


 


BUN   6 L  (9-20)  mg/dL


 


Glucose   138 H  ()  mg/dL


 


C-Reactive Protein  9.90 H   (0.00-1.30)  mg/dL

## 2018-01-23 NOTE — PROGRESS NOTE
<SHARONA NI - Last Filed: 01/23/18 14:00>





Assessment and Plan


Assessment and plan: 





This is a 47-year-old male who was previously on known to this provider, patient

's does not have a local primary care doctor, and endorses a past history of IV 

methamphetamine abuse within the past month.  Patient reports that a few days 

ago he had a sore on the dorsal aspect of his penis, and "picked it off."  

Shortly thereafter developed diffuse penile pain and swelling, left-sided 

testicular pain, right lower quadrant pain and right lower back pain.  He 

endorses fevers, chills and body aches.  He thinks that he has the flu.  He 

denies severe headache, neck pain, chest pain, shortness of breath, irritative 

urinary symptoms.  His pain is constant, does not radiate anywhere, it 

increases with palpation and decreases with rest





Penile cellulitis


Urology and ID following


Pt to continue vanco, zosyn, doxy and tamiflu, IV acyclovir initiated


f/u HIV, RPR, GC and chlamydia 


penile ulcer culture ordered, MRI pelvis ordered, possible exploration





Sepsis affecting skin


Improving, continue antibiotics, ID consulted





Current nicotine use


Patient counseled on cessation, nicotine patch ordered





DVT prophylaxis


Lovenox








History


Interval history: 





Patient was seen and examined.  He is resting comfortably with decreased penile 

pain today. He denies CP, SOB, NV. Nursing notes, labs and imaging reviewed.





Hospitalist Physical





- Constitutional


Vitals: 


 











Temp Pulse Resp BP Pulse Ox


 


 98.8 F   82   18   85/52   93 


 


 01/23/18 07:19  01/23/18 07:19  01/23/18 07:19  01/23/18 07:19  01/23/18 07:19











General appearance: Present: no acute distress, well-nourished





- EENT


Eyes: Present: PERRL, EOM intact


ENT: hearing intact, clear oral mucosa





- Neck


Neck: Present: supple, normal ROM





- Respiratory


Respiratory effort: normal


Respiratory: bilateral: CTA





- Cardiovascular


Rhythm: regular


Heart Sounds: Present: S1 & S2





- Extremities


Extremities: no ischemia, No edema





- Abdominal


General gastrointestinal: soft, non-tender, non-distended





- Integumentary


Integumentary: Present: clear, warm, dry





- Psychiatric


Psychiatric: appropriate mood/affect, cooperative





- Neurologic


Neurologic: CNII-XII intact





- Allied Health


Allied health notes reviewed: nursing





Results





- Labs


CBC & Chem 7: 


 01/23/18 05:15





 01/23/18 05:15


Labs: 


 Laboratory Last Values











WBC  10.4 K/mm3 (4.5-11.0)   01/23/18  05:15    


 


RBC  4.01 M/mm3 (3.65-5.03)   01/23/18  05:15    


 


Hgb  12.5 gm/dl (11.8-15.2)   01/23/18  05:15    


 


Hct  39.0 % (35.5-45.6)   01/23/18  05:15    


 


MCV  90 fl (84-94)   01/23/18  05:15    


 


MCH  31 pg (28-32)   01/23/18  05:15    


 


MCHC  34 % (32-34)   01/23/18  05:15    


 


RDW  13.5 % (13.2-15.2)   01/23/18  05:15    


 


Plt Count  208 K/mm3 (140-440)   01/23/18  05:15    


 


Lymph % (Auto)  Np   01/22/18  04:00    


 


Mono % (Auto)  Np   01/22/18  04:00    


 


Eos % (Auto)  Np   01/22/18  04:00    


 


Baso % (Auto)  Np   01/22/18  04:00    


 


Lymph #  Np   01/22/18  04:00    


 


Mono #  Np   01/22/18  04:00    


 


Eos #  Np   01/22/18  04:00    


 


Baso #  Np   01/22/18  04:00    


 


Seg Neutrophils %  Np   01/22/18  04:00    


 


Seg Neutrophils #  Np   01/22/18  04:00    


 


Sodium  137 mmol/L (137-145)   01/23/18  05:15    


 


Potassium  3.7 mmol/L (3.6-5.0)   01/23/18  05:15    


 


Chloride  102.2 mmol/L ()   01/23/18  05:15    


 


Carbon Dioxide  22 mmol/L (22-30)   01/23/18  05:15    


 


Anion Gap  17 mmol/L  01/23/18  05:15    


 


BUN  6 mg/dL (9-20)  L  01/23/18  05:15    


 


Creatinine  0.8 mg/dL (0.8-1.5)   01/23/18  05:15    


 


Estimated GFR  > 60 ml/min  01/23/18  05:15    


 


BUN/Creatinine Ratio  8 %  01/23/18  05:15    


 


Glucose  138 mg/dL ()  H  01/23/18  05:15    


 


POC Glucose  113  ()  H  01/22/18  07:24    


 


Lactic Acid  0.90 mmol/L (0.7-2.0)   01/21/18  23:48    


 


Calcium  8.5 mg/dL (8.4-10.2)   01/23/18  05:15    


 


C-Reactive Protein  9.90 mg/dL (0.00-1.30)  H  01/22/18  Unknown


 


Urine Color  Yellow  (Yellow)   01/21/18  18:18    


 


Urine Turbidity  Clear  (Clear)   01/21/18  18:18    


 


Urine pH  7.0  (5.0-7.0)   01/21/18  18:18    


 


Ur Specific Gravity  1.024  (1.003-1.030)   01/21/18  18:18    


 


Urine Protein  30 mg/dl mg/dL (Negative)   01/21/18  18:18    


 


Urine Glucose (UA)  Neg mg/dL (Negative)   01/21/18  18:18    


 


Urine Ketones  20 mg/dL (Negative)   01/21/18  18:18    


 


Urine Blood  Neg  (Negative)   01/21/18  18:18    


 


Urine Nitrite  Neg  (Negative)   01/21/18  18:18    


 


Urine Bilirubin  Neg  (Negative)   01/21/18  18:18    


 


Urine Urobilinogen  4.0 mg/dL (<2.0)   01/21/18  18:18    


 


Ur Leukocyte Esterase  Neg  (Negative)   01/21/18  18:18    


 


Urine WBC (Auto)  5.0 /HPF (0.0-6.0)   01/21/18  18:18    


 


Urine RBC (Auto)  20.0 /HPF (0.0-6.0)   01/21/18  18:18    


 


U Epithel Cells (Auto)  1.0 /HPF (0-13.0)   01/21/18  18:18    


 


Urine Mucus  Few /HPF  01/21/18  18:18    


 


RPR  Nonreactive  (Nonreactive)   01/22/18  19:36    


 


HIV 1&2 Antibody Rapid  N  (Non React)   01/22/18  19:36    


 


HIV P24 Antigen  N  (Non React)   01/22/18  19:36    














<MOUNA SHAFER R - Last Filed: 01/23/18 21:19>





Assessment and Plan


Assessment and plan: 





I saw and evaluated the patient. I agree with the findings and the plan of care 

as documented in the Nurse Practitioner's~note, with the following corrections 

and additions.





Will stop Tamiflu as influenza test was negative. 








Hospitalist Physical





- Constitutional


Vitals: 


 











Temp Pulse Resp BP Pulse Ox


 


 102.3 F H  81   18   122/74   97 


 


 01/23/18 15:47  01/23/18 15:47  01/23/18 15:47  01/23/18 15:47  01/23/18 15:47














Results





- Labs


CBC & Chem 7: 


 01/23/18 05:15





 01/23/18 05:15


Labs: 


 Laboratory Last Values











WBC  10.4 K/mm3 (4.5-11.0)   01/23/18  05:15    


 


RBC  4.01 M/mm3 (3.65-5.03)   01/23/18  05:15    


 


Hgb  12.5 gm/dl (11.8-15.2)   01/23/18  05:15    


 


Hct  39.0 % (35.5-45.6)   01/23/18  05:15    


 


MCV  90 fl (84-94)   01/23/18  05:15    


 


MCH  31 pg (28-32)   01/23/18  05:15    


 


MCHC  34 % (32-34)   01/23/18  05:15    


 


RDW  13.5 % (13.2-15.2)   01/23/18  05:15    


 


Plt Count  208 K/mm3 (140-440)   01/23/18  05:15    


 


Lymph % (Auto)  Np   01/22/18  04:00    


 


Mono % (Auto)  Np   01/22/18  04:00    


 


Eos % (Auto)  Np   01/22/18  04:00    


 


Baso % (Auto)  Np   01/22/18  04:00    


 


Lymph #  Np   01/22/18  04:00    


 


Mono #  Np   01/22/18  04:00    


 


Eos #  Np   01/22/18  04:00    


 


Baso #  Np   01/22/18  04:00    


 


Seg Neutrophils %  Np   01/22/18  04:00    


 


Seg Neutrophils #  Np   01/22/18  04:00    


 


Sodium  137 mmol/L (137-145)   01/23/18  05:15    


 


Potassium  3.7 mmol/L (3.6-5.0)   01/23/18  05:15    


 


Chloride  102.2 mmol/L ()   01/23/18  05:15    


 


Carbon Dioxide  22 mmol/L (22-30)   01/23/18  05:15    


 


Anion Gap  17 mmol/L  01/23/18  05:15    


 


BUN  6 mg/dL (9-20)  L  01/23/18  05:15    


 


Creatinine  0.8 mg/dL (0.8-1.5)   01/23/18  05:15    


 


Estimated GFR  > 60 ml/min  01/23/18  05:15    


 


BUN/Creatinine Ratio  8 %  01/23/18  05:15    


 


Glucose  138 mg/dL ()  H  01/23/18  05:15    


 


POC Glucose  113  ()  H  01/22/18  07:24    


 


Lactic Acid  0.90 mmol/L (0.7-2.0)   01/21/18  23:48    


 


Calcium  8.5 mg/dL (8.4-10.2)   01/23/18  05:15    


 


C-Reactive Protein  9.90 mg/dL (0.00-1.30)  H  01/22/18  Unknown


 


Urine Color  Yellow  (Yellow)   01/21/18  18:18    


 


Urine Turbidity  Clear  (Clear)   01/21/18  18:18    


 


Urine pH  7.0  (5.0-7.0)   01/21/18  18:18    


 


Ur Specific Gravity  1.024  (1.003-1.030)   01/21/18  18:18    


 


Urine Protein  30 mg/dl mg/dL (Negative)   01/21/18  18:18    


 


Urine Glucose (UA)  Neg mg/dL (Negative)   01/21/18  18:18    


 


Urine Ketones  20 mg/dL (Negative)   01/21/18  18:18    


 


Urine Blood  Neg  (Negative)   01/21/18  18:18    


 


Urine Nitrite  Neg  (Negative)   01/21/18  18:18    


 


Urine Bilirubin  Neg  (Negative)   01/21/18  18:18    


 


Urine Urobilinogen  4.0 mg/dL (<2.0)   01/21/18  18:18    


 


Ur Leukocyte Esterase  Neg  (Negative)   01/21/18  18:18    


 


Urine WBC (Auto)  5.0 /HPF (0.0-6.0)   01/21/18  18:18    


 


Urine RBC (Auto)  20.0 /HPF (0.0-6.0)   01/21/18  18:18    


 


U Epithel Cells (Auto)  1.0 /HPF (0-13.0)   01/21/18  18:18    


 


Urine Mucus  Few /HPF  01/21/18  18:18    


 


RPR  Nonreactive  (Nonreactive)   01/22/18  19:36    


 


HIV 1&2 Antibody Rapid  N  (Non React)   01/22/18  19:36    


 


HIV P24 Antigen  N  (Non React)   01/22/18  19:36

## 2018-01-23 NOTE — POST ANESTHESIA EVALUATION
- Post Anesthesia Evaluation


Patient Participated: No (Patient sedated for return to the ICU)


Airway Patent: Yes (trach remains the ventilation site)


Stable Respiratory Function: Yes (with ventilator of which he arrived on )


Temp > 96.8F: Yes


Adequeate Hydration: Yes


Anesthesia Complications: No


Block Receding Appropriately: Not Applicable


Patient on Ventilator: Yes (Patient to return to ICU)

## 2018-01-23 NOTE — ANESTHESIA CONSULTATION
Anesthesia Consult and Med Hx


Date of service: 01/23/18





- Airway


Anesthetic Teeth Evaluation: Good


ROM Head & Neck: Adequate


Mental/Hyoid Distance: Adequate


Mallampati Class: Class II


Intubation Access Assessment: Probably Good





- Pulmonary Exam


CTA: Yes





- Cardiac Exam


Cardiac Exam: RRR





- Pre-Operative Health Status


ASA Pre-Surgery Classification: ASA2


Proposed Anesthetic Plan: MAC (12pack years)





- Pulmonary


Hx Smoking: Yes


Hx Asthma: No


COPD: No


Hx Pneumonia: No





- Cardiovascular System


Hx Hypertension: No





- Central Nervous System


Hx Psychiatric Problems: No





- Gastrointestinal


Hx Gastroesophageal Reflux Disease: No





- Endocrine


Hx End Stage Renal Disease: No





- Other Systems


Hx Alcohol Use: Yes (at least weekly)


Hx Substance Use: Yes (admits to experimenting including cocaine and meth)


Hx Cancer: No

## 2018-01-24 PROCEDURE — 0V9SXZZ DRAINAGE OF PENIS, EXTERNAL APPROACH: ICD-10-PCS | Performed by: UROLOGY

## 2018-01-24 PROCEDURE — 0VBSXZZ EXCISION OF PENIS, EXTERNAL APPROACH: ICD-10-PCS | Performed by: UROLOGY

## 2018-01-24 RX ADMIN — MORPHINE SULFATE PRN MG: 2 INJECTION, SOLUTION INTRAMUSCULAR; INTRAVENOUS at 20:07

## 2018-01-24 RX ADMIN — DOXYCYCLINE HYCLATE SCH MG: 100 CAPSULE ORAL at 21:39

## 2018-01-24 RX ADMIN — MORPHINE SULFATE PRN MG: 2 INJECTION, SOLUTION INTRAMUSCULAR; INTRAVENOUS at 15:01

## 2018-01-24 RX ADMIN — HYDROMORPHONE HYDROCHLORIDE PRN MG: 1 INJECTION, SOLUTION INTRAMUSCULAR; INTRAVENOUS; SUBCUTANEOUS at 12:13

## 2018-01-24 RX ADMIN — VANCOMYCIN HYDROCHLORIDE SCH MLS/HR: 100 INJECTION, POWDER, LYOPHILIZED, FOR SOLUTION INTRAVENOUS at 10:09

## 2018-01-24 RX ADMIN — NICOTINE SCH: 14 PATCH TRANSDERMAL at 10:00

## 2018-01-24 RX ADMIN — MORPHINE SULFATE PRN MG: 2 INJECTION, SOLUTION INTRAMUSCULAR; INTRAVENOUS at 06:33

## 2018-01-24 RX ADMIN — ACYCLOVIR SODIUM SCH MLS/HR: 500 INJECTION, SOLUTION INTRAVENOUS at 06:32

## 2018-01-24 RX ADMIN — MORPHINE SULFATE PRN MG: 2 INJECTION, SOLUTION INTRAMUSCULAR; INTRAVENOUS at 01:32

## 2018-01-24 RX ADMIN — ENOXAPARIN SODIUM SCH: 100 INJECTION SUBCUTANEOUS at 10:00

## 2018-01-24 RX ADMIN — ACYCLOVIR SODIUM SCH MLS/HR: 500 INJECTION, SOLUTION INTRAVENOUS at 21:39

## 2018-01-24 RX ADMIN — HYDROMORPHONE HYDROCHLORIDE PRN MG: 1 INJECTION, SOLUTION INTRAMUSCULAR; INTRAVENOUS; SUBCUTANEOUS at 12:35

## 2018-01-24 RX ADMIN — DOXYCYCLINE HYCLATE SCH: 100 CAPSULE ORAL at 10:00

## 2018-01-24 RX ADMIN — ACYCLOVIR SODIUM SCH MLS/HR: 500 INJECTION, SOLUTION INTRAVENOUS at 15:01

## 2018-01-24 RX ADMIN — SODIUM CHLORIDE SCH MLS/HR: 0.9 INJECTION, SOLUTION INTRAVENOUS at 09:32

## 2018-01-24 RX ADMIN — VANCOMYCIN HYDROCHLORIDE SCH MLS/HR: 100 INJECTION, POWDER, LYOPHILIZED, FOR SOLUTION INTRAVENOUS at 17:45

## 2018-01-24 RX ADMIN — LEVOFLOXACIN SCH: 5 INJECTION, SOLUTION INTRAVENOUS at 10:00

## 2018-01-24 RX ADMIN — HYDROMORPHONE HYDROCHLORIDE PRN MG: 1 INJECTION, SOLUTION INTRAMUSCULAR; INTRAVENOUS; SUBCUTANEOUS at 12:45

## 2018-01-24 RX ADMIN — HYDROMORPHONE HYDROCHLORIDE PRN MG: 1 INJECTION, SOLUTION INTRAMUSCULAR; INTRAVENOUS; SUBCUTANEOUS at 12:03

## 2018-01-24 NOTE — PROGRESS NOTE
<SHARONA NI - Last Filed: 01/25/18 08:35>





Assessment and Plan


Assessment and plan: 





This is a 47-year-old male who was previously on known to this provider, patient

's does not have a local primary care doctor, and endorses a past history of IV 

methamphetamine abuse within the past month.  Patient reports that a few days 

ago he had a sore on the dorsal aspect of his penis, and "picked it off."  

Shortly thereafter developed diffuse penile pain and swelling, left-sided 

testicular pain, right lower quadrant pain and right lower back pain.  He 

endorses fevers, chills and body aches.  He thinks that he has the flu.  He 

denies severe headache, neck pain, chest pain, shortness of breath, irritative 

urinary symptoms.  His pain is constant, does not radiate anywhere, it 

increases with palpation and decreases with rest





Penile cellulitis


Urology and ID following


Pt to continue vanco, zosyn, doxy, IV acyclovir stop tamiflu


f/u HIV, RPR, GC and chlamydia 


Status post surgical I&D of penile abscess


MRI pelvis showed bilateral inguinal lymph nodes, no evidence of abscess in 

penis and pelvis





Sepsis affecting skin


Improving, continue antibiotics, ID following





Current nicotine use


Patient counseled on cessation, nicotine patch ordered





DVT prophylaxis


Lovenox








History


Interval history: 





Patient was seen and examined.  He is resting comfortably, continues to 

complain of penile pain. He denies CP, SOB, NV. Nursing notes, labs and imaging 

reviewed.





Hospitalist Physical





- Constitutional


Vitals: 


 











Temp Pulse Resp BP Pulse Ox


 


 97.5 F L  58 L  20   101/63   100 


 


 01/24/18 14:20  01/24/18 14:20  01/24/18 14:20  01/24/18 14:20  01/24/18 14:20











General appearance: Present: no acute distress, well-nourished





- EENT


Eyes: Present: PERRL, EOM intact


ENT: hearing intact, clear oral mucosa





- Neck


Neck: Present: supple, normal ROM





- Respiratory


Respiratory effort: normal


Respiratory: bilateral: CTA





- Cardiovascular


Rhythm: regular


Heart Sounds: Present: S1 & S2





- Extremities


Extremities: no ischemia, No edema





- Abdominal


General gastrointestinal: soft, non-tender, non-distended





- Integumentary


Integumentary: Present: clear, warm, dry





- Psychiatric


Psychiatric: appropriate mood/affect, cooperative





- Neurologic


Neurologic: CNII-XII intact, moves all extremities





- Allied Health


Allied health notes reviewed: nursing





Results





- Labs


CBC & Chem 7: 


 01/23/18 05:15





 01/23/18 05:15


Labs: 


 Laboratory Last Values











WBC  10.4 K/mm3 (4.5-11.0)   01/23/18  05:15    


 


RBC  4.01 M/mm3 (3.65-5.03)   01/23/18  05:15    


 


Hgb  12.5 gm/dl (11.8-15.2)   01/23/18  05:15    


 


Hct  39.0 % (35.5-45.6)   01/23/18  05:15    


 


MCV  90 fl (84-94)   01/23/18  05:15    


 


MCH  31 pg (28-32)   01/23/18  05:15    


 


MCHC  34 % (32-34)   01/23/18  05:15    


 


RDW  13.5 % (13.2-15.2)   01/23/18  05:15    


 


Plt Count  208 K/mm3 (140-440)   01/23/18  05:15    


 


Lymph % (Auto)  Np   01/22/18  04:00    


 


Mono % (Auto)  Np   01/22/18  04:00    


 


Eos % (Auto)  Np   01/22/18  04:00    


 


Baso % (Auto)  Np   01/22/18  04:00    


 


Lymph #  Np   01/22/18  04:00    


 


Mono #  Np   01/22/18  04:00    


 


Eos #  Np   01/22/18  04:00    


 


Baso #  Np   01/22/18  04:00    


 


Seg Neutrophils %  Np   01/22/18  04:00    


 


Seg Neutrophils #  Np   01/22/18  04:00    


 


Sodium  137 mmol/L (137-145)   01/23/18  05:15    


 


Potassium  3.7 mmol/L (3.6-5.0)   01/23/18  05:15    


 


Chloride  102.2 mmol/L ()   01/23/18  05:15    


 


Carbon Dioxide  22 mmol/L (22-30)   01/23/18  05:15    


 


Anion Gap  17 mmol/L  01/23/18  05:15    


 


BUN  6 mg/dL (9-20)  L  01/23/18  05:15    


 


Creatinine  0.8 mg/dL (0.8-1.5)   01/23/18  05:15    


 


Estimated GFR  > 60 ml/min  01/23/18  05:15    


 


BUN/Creatinine Ratio  8 %  01/23/18  05:15    


 


Glucose  138 mg/dL ()  H  01/23/18  05:15    


 


POC Glucose  102  ()   01/24/18  12:14    


 


Lactic Acid  0.90 mmol/L (0.7-2.0)   01/21/18  23:48    


 


Calcium  8.5 mg/dL (8.4-10.2)   01/23/18  05:15    


 


C-Reactive Protein  9.90 mg/dL (0.00-1.30)  H  01/22/18  Unknown


 


Urine Color  Yellow  (Yellow)   01/21/18  18:18    


 


Urine Turbidity  Clear  (Clear)   01/21/18  18:18    


 


Urine pH  7.0  (5.0-7.0)   01/21/18  18:18    


 


Ur Specific Gravity  1.024  (1.003-1.030)   01/21/18  18:18    


 


Urine Protein  30 mg/dl mg/dL (Negative)   01/21/18  18:18    


 


Urine Glucose (UA)  Neg mg/dL (Negative)   01/21/18  18:18    


 


Urine Ketones  20 mg/dL (Negative)   01/21/18  18:18    


 


Urine Blood  Neg  (Negative)   01/21/18  18:18    


 


Urine Nitrite  Neg  (Negative)   01/21/18  18:18    


 


Urine Bilirubin  Neg  (Negative)   01/21/18  18:18    


 


Urine Urobilinogen  4.0 mg/dL (<2.0)   01/21/18  18:18    


 


Ur Leukocyte Esterase  Neg  (Negative)   01/21/18  18:18    


 


Urine WBC (Auto)  5.0 /HPF (0.0-6.0)   01/21/18  18:18    


 


Urine RBC (Auto)  20.0 /HPF (0.0-6.0)   01/21/18  18:18    


 


U Epithel Cells (Auto)  1.0 /HPF (0-13.0)   01/21/18  18:18    


 


Urine Mucus  Few /HPF  01/21/18  18:18    


 


Vancomycin Trough  8.9 ug/mL (5.0-20.0)   01/23/18  20:48    


 


RPR  Nonreactive  (Nonreactive)   01/22/18  19:36    


 


HIV 1&2 Antibody Rapid  N  (Non React)   01/22/18  19:36    


 


HIV P24 Antigen  N  (Non React)   01/22/18  19:36    














<MOUNA SHAFER R - Last Filed: 01/25/18 08:44>





Assessment and Plan


Assessment and plan: 





I saw and evaluated the patient on 01/24/18. I agree with the findings and the 

plan of care as documented in the Nurse Practitioner's~note, with the following 

corrections and additions.





- continue vanco, zosyn, doxy and acyclovir for now- Plan to narrow spectrum 

tomorrow per ID











Hospitalist Physical





- Constitutional


Vitals: 


 











Temp Pulse Resp BP Pulse Ox


 


 97.5 F L  61   20   96/53   97 


 


 01/24/18 14:20  01/24/18 15:25  01/24/18 15:25  01/24/18 15:25  01/24/18 20:59














Results





- Labs


CBC & Chem 7: 


 01/23/18 05:15





 01/23/18 05:15


Labs: 


 Laboratory Last Values











WBC  10.4 K/mm3 (4.5-11.0)   01/23/18  05:15    


 


RBC  4.01 M/mm3 (3.65-5.03)   01/23/18  05:15    


 


Hgb  12.5 gm/dl (11.8-15.2)   01/23/18  05:15    


 


Hct  39.0 % (35.5-45.6)   01/23/18  05:15    


 


MCV  90 fl (84-94)   01/23/18  05:15    


 


MCH  31 pg (28-32)   01/23/18  05:15    


 


MCHC  34 % (32-34)   01/23/18  05:15    


 


RDW  13.5 % (13.2-15.2)   01/23/18  05:15    


 


Plt Count  208 K/mm3 (140-440)   01/23/18  05:15    


 


Lymph % (Auto)  Np   01/22/18  04:00    


 


Mono % (Auto)  Np   01/22/18  04:00    


 


Eos % (Auto)  Np   01/22/18  04:00    


 


Baso % (Auto)  Np   01/22/18  04:00    


 


Lymph #  Np   01/22/18  04:00    


 


Mono #  Np   01/22/18  04:00    


 


Eos #  Np   01/22/18  04:00    


 


Baso #  Np   01/22/18  04:00    


 


Seg Neutrophils %  Np   01/22/18  04:00    


 


Seg Neutrophils #  Np   01/22/18  04:00    


 


Sodium  137 mmol/L (137-145)   01/23/18  05:15    


 


Potassium  3.7 mmol/L (3.6-5.0)   01/23/18  05:15    


 


Chloride  102.2 mmol/L ()   01/23/18  05:15    


 


Carbon Dioxide  22 mmol/L (22-30)   01/23/18  05:15    


 


Anion Gap  17 mmol/L  01/23/18  05:15    


 


BUN  6 mg/dL (9-20)  L  01/23/18  05:15    


 


Creatinine  0.8 mg/dL (0.8-1.5)   01/23/18  05:15    


 


Estimated GFR  > 60 ml/min  01/23/18  05:15    


 


BUN/Creatinine Ratio  8 %  01/23/18  05:15    


 


Glucose  138 mg/dL ()  H  01/23/18  05:15    


 


POC Glucose  102  ()   01/24/18  12:14    


 


Lactic Acid  0.90 mmol/L (0.7-2.0)   01/21/18  23:48    


 


Calcium  8.5 mg/dL (8.4-10.2)   01/23/18  05:15    


 


C-Reactive Protein  9.90 mg/dL (0.00-1.30)  H  01/22/18  Unknown


 


Urine Color  Yellow  (Yellow)   01/21/18  18:18    


 


Urine Turbidity  Clear  (Clear)   01/21/18  18:18    


 


Urine pH  7.0  (5.0-7.0)   01/21/18  18:18    


 


Ur Specific Gravity  1.024  (1.003-1.030)   01/21/18  18:18    


 


Urine Protein  30 mg/dl mg/dL (Negative)   01/21/18  18:18    


 


Urine Glucose (UA)  Neg mg/dL (Negative)   01/21/18  18:18    


 


Urine Ketones  20 mg/dL (Negative)   01/21/18  18:18    


 


Urine Blood  Neg  (Negative)   01/21/18  18:18    


 


Urine Nitrite  Neg  (Negative)   01/21/18  18:18    


 


Urine Bilirubin  Neg  (Negative)   01/21/18  18:18    


 


Urine Urobilinogen  4.0 mg/dL (<2.0)   01/21/18  18:18    


 


Ur Leukocyte Esterase  Neg  (Negative)   01/21/18  18:18    


 


Urine WBC (Auto)  5.0 /HPF (0.0-6.0)   01/21/18  18:18    


 


Urine RBC (Auto)  20.0 /HPF (0.0-6.0)   01/21/18  18:18    


 


U Epithel Cells (Auto)  1.0 /HPF (0-13.0)   01/21/18  18:18    


 


Urine Mucus  Few /HPF  01/21/18  18:18    


 


Vancomycin Trough  8.9 ug/mL (5.0-20.0)   01/23/18  20:48    


 


RPR  Nonreactive  (Nonreactive)   01/22/18  19:36    


 


HIV 1&2 Antibody Rapid  N  (Non React)   01/22/18  19:36    


 


HIV P24 Antigen  N  (Non React)   01/22/18  19:36

## 2018-01-24 NOTE — PROGRESS NOTE
Assessment and Plan


Assessment: 


1) Sepsis: better.  Etiology most likely penile abscess. CRP=9.9


2) Penile abscess / initial painful penile lesion: initial lesion unclear 

etiology ? HSV (given partner positive for HSV) with superimposed bacterial 

infection versus folliculitis versus LVG (doubt since LVG is painless) versus 

Chancroid (it is painful but rare in [place]).


   -Penis MRI neg for abscess   


   -s/p OR I+D with purulence obtained 


   -RPR neg   


3) IV amphetamine user 


4) Cough: CXR neg. Influenza Ag neg





Plan:


-f/u OR cultures


-contact isolation until MRSA is r/o


-continue vanco, zosyn, doxy and acyclovir for now-will narrow spectrum tomorrow


-stop tamiflu


-f/u HIV GC and chlamydia 





Thank you MIKAELA Armas for your consultation, will follow up with you. 





Cathy Handy MD


Infectious Diseases Specialist


[facility] ([facility])


M [phone]


O [phone]











Subjective


Date of service: 01/24/18


Principal diagnosis: penile lesion edema 


Interval history: 


feels better, fever trending down, went to the OR





Microbiology:





Blood cultures:


1/21 ngtd





Influenza: neg





Current Antimicrobials:


Tamiflu


Levaquin


Vancomycin


Doxy


Acyclovir 


 


Previous Antimicrobials:











Objective





- Exam


Narrative Exam: 


General appearance: Alert in NAD, conversant 


Eyes: anicteric sclerae, moist conjunctivae; no lid-lag; PERRLA 


HENT: Atraumatic; oropharynx clear  


Neck: Trachea midline; supple, no thyromegaly or lymphadenopathy 


Lungs: CTA 


CV: RRR, no murmurs


Abdomen: Soft, non-tender; no masses or hepatosplenomegaly


Extremities: No peripheral edema or extremity lymphadenopathy


Skin: Normal temperature, turgor and texture; no rash, ulcers or subcutaneous 

nodules 


Gen: marked penile edema with drain draining pus


Psych: Appropriate affect, alert and oriented to person, place and time. Neuro: 

alert and oriented x 3. Moving all extermities


Lines: No CVL / PICC














- Constitutional


Vitals: 


 Vital Signs











Temp Pulse Resp BP Pulse Ox


 


 97.5 F L  61   20   96/53   99 


 


 01/24/18 14:20  01/24/18 15:25  01/24/18 15:25  01/24/18 15:25  01/24/18 15:25








 Temperature -Last 24 Hours











Temperature                    97.5 F


 


Temperature                    97.5 F


 


Temperature                    97.3 F


 


Temperature                    99.0 F


 


Temperature                    97.6 F


 


Temperature                    99.7 F


 


Temperature                    99.7 F


 


Temperature                    99.4 F


 


Temperature                    98.2 F

















- Labs


CBC & Chem 7: 


 01/23/18 05:15





 01/23/18 05:15

## 2018-01-24 NOTE — OPERATIVE REPORT
PREOPERATIVE DIAGNOSIS:  Penile abscess.



POSTOPERATIVE DIAGNOSIS:  Penile abscess.



PROCEDURE:  Incision and drainage of penile abscess.



SURGEON:  Sidney Whaley MD.



ANESTHESIA:  General.



FINDINGS:  This is a gentleman who presented with an abscess, whether there was

an injection or a bite, I do not know, but there is a big abscess extending from

the right dorsum of the penis extending towards the groin.  He now presents for

drainage.



DESCRIPTION OF PROCEDURE:  The patient was brought to the operating room and

placed on the table.  Following induction of anesthesia, placed in the supine

position, prepped and draped in usual sterile fashion.  The area was opened up

and a large amount of purulent material was evacuated.  Cultures were obtained. 

Some of the skin was necrotic and debrided.  We approximated some of the skin

very loosely with chromic and Vicryl and secured the Penrose drain in place. 

The patient tolerated the procedure well and brought to recovery room in stable

condition.





DD: 01/24/2018 12:12

DT: 01/24/2018 12:31

JOB# [id number]  [id number]

ERNESTO/JOVAN

## 2018-01-24 NOTE — POST ANESTHESIA EVALUATION
- Post Anesthesia Evaluation


Patient Participated: Yes


Airway Patent: Yes


Stable Respiratory Function: Yes


Nausea/Vomiting: No


Temp > 96.8F: Yes


Pain Manageable: Yes


Adequeate Hydration: Yes


Anesthesia Complications: No


Block Receding Appropriately: Not Applicable

## 2018-01-24 NOTE — POST OPERATIVE NOTE
Date of procedure: 01/24/18


Pre-op diagnosis: penile abcess 


Post-op diagnosis: same


Findings: 





penile abcess 


Procedure: 





i and d penile abcess 


Anesthesia: GETA


Surgeon: ETHAN SHRESTHA


Estimated blood loss: minimal


Pathology: list (abcess)


Specimen disposition: to lab


Condition: stable


Disposition: PACU

## 2018-01-25 VITALS — DIASTOLIC BLOOD PRESSURE: 63 MMHG | SYSTOLIC BLOOD PRESSURE: 101 MMHG

## 2018-01-25 RX ADMIN — SODIUM CHLORIDE SCH MLS/HR: 0.9 INJECTION, SOLUTION INTRAVENOUS at 05:45

## 2018-01-25 RX ADMIN — ENOXAPARIN SODIUM SCH MG: 100 INJECTION SUBCUTANEOUS at 10:00

## 2018-01-25 RX ADMIN — DOXYCYCLINE HYCLATE SCH MG: 100 CAPSULE ORAL at 11:58

## 2018-01-25 RX ADMIN — NICOTINE SCH MG: 14 PATCH TRANSDERMAL at 10:00

## 2018-01-25 RX ADMIN — VANCOMYCIN HYDROCHLORIDE SCH MLS/HR: 100 INJECTION, POWDER, LYOPHILIZED, FOR SOLUTION INTRAVENOUS at 02:05

## 2018-01-25 RX ADMIN — ACYCLOVIR SODIUM SCH MLS/HR: 500 INJECTION, SOLUTION INTRAVENOUS at 05:46

## 2018-01-25 RX ADMIN — VANCOMYCIN HYDROCHLORIDE SCH MLS/HR: 100 INJECTION, POWDER, LYOPHILIZED, FOR SOLUTION INTRAVENOUS at 12:00

## 2018-01-25 RX ADMIN — LEVOFLOXACIN SCH: 5 INJECTION, SOLUTION INTRAVENOUS at 10:00

## 2018-01-25 RX ADMIN — MORPHINE SULFATE PRN MG: 2 INJECTION, SOLUTION INTRAMUSCULAR; INTRAVENOUS at 05:33

## 2018-01-25 RX ADMIN — OXYCODONE AND ACETAMINOPHEN PRN TAB: 5; 325 TABLET ORAL at 14:44

## 2018-01-25 RX ADMIN — MORPHINE SULFATE PRN MG: 2 INJECTION, SOLUTION INTRAMUSCULAR; INTRAVENOUS at 00:47

## 2018-01-25 RX ADMIN — OXYCODONE AND ACETAMINOPHEN PRN TAB: 5; 325 TABLET ORAL at 02:04

## 2018-01-25 NOTE — PROGRESS NOTE
Assessment and Plan





Assessment: 


1) Sepsis: better.  Etiology most likely penile abscess. CRP=9.9


2) Penile abscess / initial painful penile lesion: initial lesion unclear 

etiology ? HSV (given partner positive for HSV) with superimposed bacterial 

infection versus folliculitis versus LVG (doubt since LVG is painless) versus 

Chancroid (it is painful but rare in [place]).


   -Penis MRI neg for abscess   


   -s/p OR I+D with purulence obtained 


   -RPR neg


   -surgical culture positive for MRSA   


3) IV amphetamine user 


4) Cough: CXR neg. Influenza Ag neg





Plan:


-continue contact isolation for MRSA 


-continue doxycycline day 4 and vancomycin day 5


-stop levaquin and acyclovir


-f/u GC and chlamydia 


-upon discharge will do zyvox 600 mg po q 12 hrs x 14 days, continue 

doxycycline 100 mg po to complete 14 days


-follow up with Urologist





Thank you MIKAELA Armas for your consultation, will follow up with you. 





MAKAYLA Staples for Dr. Cathy Handy MD


Infectious Diseases Specialist


[facility] ([facility])


M [phone]


O [phone]





Subjective


Date of service: 01/25/18


Principal diagnosis: penile lesion edema 


Interval history: 





I feel better, no fever








Microbiology:





Blood cultures:


1/21 ngtd





Influenza: neg





Foreskin surgical culture positive for MRSA





Current Antimicrobials:





Vancomycin 1/21


Doxy 1/22





 


Previous Antimicrobials:


Tamiflu


Levaquin


Acyclovir 





Objective





- Exam


Narrative Exam: 





General appearance: Alert in NAD, conversant 


Eyes: anicteric sclerae, moist conjunctivae; no lid-lag; PERRLA 


HENT: Atraumatic; oropharynx clear  


Neck: Trachea midline; supple, no thyromegaly or lymphadenopathy 


Lungs: CTA 


CV: RRR, no murmurs


Abdomen: Soft, non-tender; no masses or hepatosplenomegaly


Extremities: No peripheral edema or extremity lymphadenopathy


Skin: Normal temperature, turgor and texture; no rash, ulcers or subcutaneous 

nodules 


Gen: marked penile edema with drain draining pus


Psych: Appropriate affect, calm and cooperative


Neuro: alert and oriented x 3. Moving all extermities


Lines: No CVL / PICC








- Constitutional


Vitals: 


 Vital Signs











Temp Pulse Resp BP Pulse Ox


 


 97.8 F   69   20   93/42   97 


 


 01/25/18 07:35  01/25/18 07:35  01/25/18 07:35  01/25/18 07:35  01/25/18 07:35








 Temperature -Last 24 Hours











Temperature                    97.8 F


 


Temperature                    97.7 F


 


Temperature                    97.5 F


 


Temperature                    97.5 F


 


Temperature                    97.3 F


 


Temperature                    99.0 F


 


Temperature                    97.6 F

















- Labs


CBC & Chem 7: 


 01/23/18 05:15





 01/23/18 05:15

## 2018-01-25 NOTE — DISCHARGE SUMMARY
Providers





- Providers


Date of Admission: 


01/21/18 22:42





Date of discharge: 01/25/18


Attending physician: 


MOUNA SHAFER





 





01/22/18 12:21


Consult to Physician [CONS] Routine 


   Consulting Provider: PENNY CASTANO


   Reason For Exam: Penile swelling


   Place consult to:: DR. SHRESTHA


   Notified:: DR. SHRESTHA





01/22/18 12:24


Consult to Physician [CONS] Routine 


   Consulting Provider: EL MCKEON


   Reason For Exam: sepsis


   Place consult to:: dr. rob


   Notified:: md


   Phone number called:: [phone]


   Was contact made?: Yes


   If yes, spoke with:: dr. rob


   Time called:: 16:38











Primary care physician: 


NATASHA CARABALLO








Hospitalization


Condition: Good


Pertinent studies: 


CT abdomen showed no acute intra-abdominal abnormality


Chest x-ray showed known underlying emphysema


MRI pelvis showed bilateral inguinal lymph nodes, no evidence of abscess in 

penis and pelvis


Testicular ultrasound showed small cysts in the right epididymis, otherwise 

normal testicular ultrasound








Hospital course: 





This is a 47-year-old male who was previously on known to this provider, patient

's does not have a local primary care doctor, and endorses a past history of IV 

methamphetamine abuse within the past month.  Patient reports that a few days 

ago he had a sore on the dorsal aspect of his penis, and "picked it off."  

Shortly thereafter developed diffuse penile pain and swelling, left-sided 

testicular pain, right lower quadrant pain and right lower back pain.  He 

endorses fevers, chills and body aches.  He thinks that he has the flu.  He 

denies severe headache, neck pain, chest pain, shortness of breath, irritative 

urinary symptoms.  His pain is constant, does not radiate anywhere, it 

increases with palpation and decreases with rest





Patient was found to have an abscess on the penile shaft.  He was taken to the 

OR for I&D which was without complication.  Patient was treated with IV 

antibiotics and was discharged with same.  Patient given instructions to follow 

up with urologist upon discharge.





Penile cellulitis


Likely secondary to underlying abscess





Sepsis affecting skin


Likely due to penile abscess





Current nicotine use


Patient counseled on cessation, nicotine patch ordered





DVT prophylaxis


Lovenox


Disposition: DC-01 TO HOME OR SELFCARE


Time spent for discharge: 35 minutes





Core Measure Documentation





- Palliative Care


Palliative Care/ Comfort Measures: Not Applicable





- Core Measures


Any of the following diagnoses?: none





Exam





- Constitutional


Vitals: 


 











Temp Pulse Resp BP Pulse Ox


 


 97.8 F   69   20   93/42   100 


 


 01/25/18 07:35  01/25/18 07:35  01/25/18 07:35  01/25/18 07:35  01/25/18 10:00











General appearance: Present: no acute distress, well-nourished





- EENT


Eyes: Present: PERRL


ENT: hearing intact, clear oral mucosa





- Neck


Neck: Present: supple, normal ROM





- Respiratory


Respiratory effort: normal


Respiratory: bilateral: CTA





- Cardiovascular


Heart Sounds: Present: S1 & S2.  Absent: rub, click





- Extremities


Extremities: pulses symmetrical, No edema


Peripheral Pulses: within normal limits





- Abdominal


General gastrointestinal: Present: soft, non-tender, non-distended, normal 

bowel sounds


Male genitourinary: Present: tender (surgical sutures with drain )





- Integumentary


Integumentary: Present: clear, warm, dry





- Musculoskeletal


Musculoskeletal: gait normal, strength equal bilaterally





- Psychiatric


Psychiatric: appropriate mood/affect, intact judgment & insight





- Neurologic


Neurologic: CNII-XII intact, moves all extremities





- Allied Health


Allied health notes reviewed: nursing





Plan


Activity: advance as tolerated


Weight Bearing Status: Weight Bear as Tolerated


Diet: regular, per dietitian instruction


Wound: per your surgeon's advice


Follow up with: 


NATASHA CARABALLO MD [Primary Care Provider] - 3-5 Days


ETHAN SHRESTHA MD [Staff Physician] - 7 Days


Prescriptions: 


Doxycycline Hyclate [Vibramycin] 100 mg PO DAILY #10 capsule


Linezolid [Zyvox] 600 mg PO BID #28 tablet


oxyCODONE /ACETAMINOPHEN [Percocet 5/325 mg] 1 tab PO Q4H PRN #12 tablet


 PRN Reason: Pain, Moderate (4-6)

## 2018-01-25 NOTE — PROGRESS NOTE
Assessment and Plan





improved 


ok to discharge with drain 


antibiotics per ID


f/u 5 days 





Subjective


Date of service: 01/25/18


Principal diagnosis: penile lesion edema 





Objective





- Constitutional


Vitals: 


 Vital Signs - 12hr











  01/24/18 01/25/18 01/25/18





  20:59 04:33 07:35


 


Temperature  97.7 F 97.8 F


 


Pulse Rate  65 69


 


Respiratory  18 20





Rate   


 


Blood Pressure  94/55 93/42


 


O2 Sat by Pulse 97 97 97





Oximetry   











General appearance: Present: no acute distress





- Respiratory


Respiratory effort: normal


Extremities: no ischemia





- Genitourinary


Male genitourinary: asymmetrical (healing .. less drainage )





- Labs


CBC & Chem 7: 


 01/23/18 05:15





 01/23/18 05:15

## 2018-01-26 ENCOUNTER — HOSPITAL ENCOUNTER (INPATIENT)
Dept: HOSPITAL 5 - ED | Age: 48
Discharge: HOME | DRG: 921 | End: 2018-01-26
Attending: INTERNAL MEDICINE | Admitting: INTERNAL MEDICINE
Payer: SELF-PAY

## 2018-01-26 VITALS — SYSTOLIC BLOOD PRESSURE: 120 MMHG | DIASTOLIC BLOOD PRESSURE: 60 MMHG

## 2018-01-26 DIAGNOSIS — D52.9: ICD-10-CM

## 2018-01-26 DIAGNOSIS — F17.200: ICD-10-CM
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
## 2018-01-26 DIAGNOSIS — S31.20XA: ICD-10-CM

## 2018-01-26 DIAGNOSIS — Z88.0: ICD-10-CM

## 2018-01-26 DIAGNOSIS — N48.21: ICD-10-CM

## 2018-01-26 DIAGNOSIS — T81.31XA: Primary | ICD-10-CM

## 2018-01-26 LAB
BASOPHILS # (AUTO): 0.1 K/MM3 (ref 0–0.1)
BASOPHILS # (AUTO): 0.1 K/MM3 (ref 0–0.1)
BASOPHILS NFR BLD AUTO: 1.1 % (ref 0–1.8)
BASOPHILS NFR BLD AUTO: 1.2 % (ref 0–1.8)
BUN SERPL-MCNC: 8 MG/DL (ref 9–20)
BUN/CREAT SERPL: 11 %
CALCIUM SERPL-MCNC: 8.5 MG/DL (ref 8.4–10.2)
EOSINOPHIL # BLD AUTO: 0.3 K/MM3 (ref 0–0.4)
EOSINOPHIL # BLD AUTO: 0.4 K/MM3 (ref 0–0.4)
EOSINOPHIL NFR BLD AUTO: 3.4 % (ref 0–4.3)
EOSINOPHIL NFR BLD AUTO: 4.3 % (ref 0–4.3)
HCT VFR BLD CALC: (no result) % (ref 35.5–45.6)
HCT VFR BLD CALC: 20.5 % (ref 35.5–45.6)
HCT VFR BLD CALC: 34.9 % (ref 35.5–45.6)
HEMOLYSIS INDEX: 99
HGB BLD-MCNC: (no result) GM/DL (ref 11.8–15.2)
HGB BLD-MCNC: 11.8 GM/DL (ref 11.8–15.2)
HGB BLD-MCNC: 7.2 GM/DL (ref 11.8–15.2)
IRON SERPL-MCNC: 62 UG/DL (ref 49–181)
LYMPHOCYTES # BLD AUTO: 2.2 K/MM3 (ref 1.2–5.4)
LYMPHOCYTES # BLD AUTO: 3.3 K/MM3 (ref 1.2–5.4)
LYMPHOCYTES NFR BLD AUTO: 31.8 % (ref 13.4–35)
LYMPHOCYTES NFR BLD AUTO: 35.4 % (ref 13.4–35)
MCH RBC QN AUTO: 30 PG (ref 28–32)
MCH RBC QN AUTO: 31 PG (ref 28–32)
MCH RBC QN AUTO: 32 PG (ref 28–32)
MCHC RBC AUTO-ENTMCNC: 34 % (ref 32–34)
MCHC RBC AUTO-ENTMCNC: 34 % (ref 32–34)
MCHC RBC AUTO-ENTMCNC: 35 % (ref 32–34)
MCV RBC AUTO: 90 FL (ref 84–94)
MCV RBC AUTO: 91 FL (ref 84–94)
MCV RBC AUTO: 92 FL (ref 84–94)
MONOCYTES # (AUTO): 0.7 K/MM3 (ref 0–0.8)
MONOCYTES # (AUTO): 1.2 K/MM3 (ref 0–0.8)
MONOCYTES % (AUTO): 11.6 % (ref 0–7.3)
MONOCYTES % (AUTO): 12.2 % (ref 0–7.3)
PLATELET # BLD: 284 K/MM3 (ref 140–440)
PLATELET # BLD: 290 K/MM3 (ref 140–440)
PLATELET # BLD: 469 K/MM3 (ref 140–440)
RBC # BLD AUTO: 2.26 M/MM3 (ref 3.65–5.03)
RBC # BLD AUTO: 3.79 M/MM3 (ref 3.65–5.03)
RBC # BLD AUTO: 3.85 M/MM3 (ref 3.65–5.03)
TIBC SERPL-MCNC: 188 MCG/DL (ref 250–450)

## 2018-01-26 PROCEDURE — 82607 VITAMIN B-12: CPT

## 2018-01-26 PROCEDURE — 96374 THER/PROPH/DIAG INJ IV PUSH: CPT

## 2018-01-26 PROCEDURE — 85027 COMPLETE CBC AUTOMATED: CPT

## 2018-01-26 PROCEDURE — 85018 HEMOGLOBIN: CPT

## 2018-01-26 PROCEDURE — 96375 TX/PRO/DX INJ NEW DRUG ADDON: CPT

## 2018-01-26 PROCEDURE — 85025 COMPLETE CBC W/AUTO DIFF WBC: CPT

## 2018-01-26 PROCEDURE — 96376 TX/PRO/DX INJ SAME DRUG ADON: CPT

## 2018-01-26 PROCEDURE — 96361 HYDRATE IV INFUSION ADD-ON: CPT

## 2018-01-26 PROCEDURE — 83550 IRON BINDING TEST: CPT

## 2018-01-26 PROCEDURE — 36415 COLL VENOUS BLD VENIPUNCTURE: CPT

## 2018-01-26 PROCEDURE — 85014 HEMATOCRIT: CPT

## 2018-01-26 PROCEDURE — 80048 BASIC METABOLIC PNL TOTAL CA: CPT

## 2018-01-26 PROCEDURE — 82747 ASSAY OF FOLIC ACID RBC: CPT

## 2018-01-26 NOTE — HISTORY AND PHYSICAL REPORT
History of Present Illness


Date of examination: 01/26/18


Date of admission: 


01/26/18 03:42





Chief complaint: 


Opening of the new post surgical wound





History of present illness: 


47 year old -American male with past medical history significant for 

penile abscess status post incision and drainage of this abscess, 

methamphetamine use was discharged yesterday from the hospital after he was 

admitted for sepsis, pain and abscess.  He was treated with IV antibiotics and 

given prescription for by mouth Zyvox and doxycycline but he couldn't refill 

his medications.  Urology was consulted and did drainage of abscess.  Patient 

has a draining tube left on the abscess site and he felt a flap and both sides 

of the draining tube are on the outside and patient presented back to the ED. 

Urology was consuulted and recommended the wound will get better on time. In 

the ED blood work was done and hemoglobin dropped from 12.5 to 7.2. No 

symptoms. patient is Johva witness and will not take any blood. ED talked with 

Dr Tolentino and recommend for follow up of the H/H. repeat H/H is pending.








REVIEW OF SYSTEMS:


GENERAL: no weight change, no fatigue, no fever


HEAD: no head ache


EYES: no blurry vision, no acute visual loss


EARS: no hearing loss, no discharge, no earache


NOSE: no stuffiness, no sneezing, no discharge


MOUTH, THROAT AND NECK: no bleeding gums, no sore throat, no swollen neck


CARDIAC: no palpitations, no dyspnea on exertion, no orthopnea, no PND, no edema

, no chest pain


RESPIRATORY: no shortness of breath, no wheeze, no cough, no sputum, no 

hemoptysis, no asthma


GI: no decreased appetite, no nausea, no vomiting, no dysphagia, no diarrhea, 

no constipation, no 


abdominal pain


URINARY: no change in frequency, no urgency, no polyuria, no hematuria, no 

incontinence


MUSCULOSKELETAL: no muscle weakness, no pain, no joint stiffness


NEUROLOGIC: no loss of sensation/numbness, no tingling, no tremors, no weakness/

paralysis


HEMATOLOGIC: no anemia, no easy bruising


SKIN: draining abscess on the penis.


ENDOCRINE: no heat/cold intolerance, no polyuria, no polydipsia, no thyroid 

problems, no diabetes


PSYCHIATRIC: no anxiety, no depression, no suicidal ideations














Past History


Past Medical History: other (Penile abscess)


Past Surgical History: Other (I/D of penile abscess)


Social history: smoking (1/2 PPD, meth use), full code.  denies: alcohol abuse, 

prescription drug abuse, IV drug use


Family history: no significant family history





Medications and Allergies


 Allergies











Allergy/AdvReac Type Severity Reaction Status Date / Time


 


Penicillins Allergy  Hives Verified 12/17/13 22:28











 Home Medications











 Medication  Instructions  Recorded  Confirmed  Last Taken  Type


 


Doxycycline Hyclate [Vibramycin] 100 mg PO DAILY #10 capsule 01/25/18  Unknown 

Rx


 


Linezolid [Zyvox] 600 mg PO BID #28 tablet 01/25/18  Unknown Rx


 


oxyCODONE /ACETAMINOPHEN [Percocet 1 tab PO Q4H PRN #12 tablet 01/25/18  

Unknown Rx





5/325 mg]     














Exam





- Physical Exam


Narrative exam: 


 Not in cardiopulmonary distress. 


 The patient appeared well nourished and normally developed.


 Vital signs as documented.


 Head exam is unremarkable.


 No scleral icterus .


 Neck is without jugular venous distension, thyromegaly, or carotid bruits. 


 Lungs are clear to auscultation.


Cardiac exam reveals regular rate and  Rhythm. First and second heart sounds 

normal. No murmurs, rubs or gallops. 


Abdominal exam reveals normal bowel sounds, no masses, no organomegaly and no 

aortic enlargement. 


Extremities are nonedematous and both femoral and pedal pulses are normal.


CNS: Alert and oriented 3.  No focal weakness.


TAL: draining abscess on the penile shaft.








- Constitutional


Vitals: 


 











Temp Pulse Resp BP Pulse Ox


 


 97.4 F L  71   16   121/58   96 


 


 01/26/18 01:43  01/26/18 04:00  01/26/18 04:44  01/26/18 04:00  01/26/18 04:20














Results





- Labs


CBC & Chem 7: 


 01/26/18 02:10





 01/26/18 02:10


Labs: 


 Laboratory Last Values











WBC  10.4 K/mm3 (4.5-11.0)   01/26/18  02:10    


 


RBC  2.26 M/mm3 (3.65-5.03)  L  01/26/18  02:10    


 


Hgb  7.2 gm/dl (11.8-15.2)  L D 01/26/18  02:10    


 


Hct  20.5 % (35.5-45.6)  L D 01/26/18  02:10    


 


MCV  91 fl (84-94)   01/26/18  02:10    


 


MCH  32 pg (28-32)   01/26/18  02:10    


 


MCHC  35 % (32-34)  H  01/26/18  02:10    


 


RDW  13.5 % (13.2-15.2)   01/26/18  02:10    


 


Plt Count  469 K/mm3 (140-440)  H D 01/26/18  02:10    


 


Lymph % (Auto)  31.8 % (13.4-35.0)   01/26/18  02:10    


 


Mono % (Auto)  11.6 % (0.0-7.3)  H  01/26/18  02:10    


 


Eos % (Auto)  3.4 % (0.0-4.3)   01/26/18  02:10    


 


Baso % (Auto)  1.1 % (0.0-1.8)   01/26/18  02:10    


 


Lymph #  3.3 K/mm3 (1.2-5.4)   01/26/18  02:10    


 


Mono #  1.2 K/mm3 (0.0-0.8)  H  01/26/18  02:10    


 


Eos #  0.4 K/mm3 (0.0-0.4)   01/26/18  02:10    


 


Baso #  0.1 K/mm3 (0.0-0.1)   01/26/18  02:10    


 


Seg Neutrophils %  52.1 % (40.0-70.0)   01/26/18  02:10    


 


Seg Neutrophils #  5.4 K/mm3 (1.8-7.7)   01/26/18  02:10    


 


Sodium  140 mmol/L (137-145)   01/26/18  02:10    


 


Potassium  4.5 mmol/L (3.6-5.0)  D 01/26/18  02:10    


 


Chloride  101.2 mmol/L ()   01/26/18  02:10    


 


Carbon Dioxide  27 mmol/L (22-30)   01/26/18  02:10    


 


Anion Gap  16 mmol/L  01/26/18  02:10    


 


BUN  8 mg/dL (9-20)  L  01/26/18  02:10    


 


Creatinine  0.7 mg/dL (0.8-1.5)  L  01/26/18  02:10    


 


Estimated GFR  > 60 ml/min  01/26/18  02:10    


 


BUN/Creatinine Ratio  11 %  01/26/18  02:10    


 


Glucose  81 mg/dL ()   01/26/18  02:10    


 


Calcium  8.5 mg/dL (8.4-10.2)   01/26/18  02:10    














Assessment and Plan


Assessment and plan: 


Anemia


- Hemoglobin drops from 12.5-7.2


- No hematemesis, melena or hematuria


- Hematology consulted


- Monitor H&H


Penile Abscess


- Patient is on by mouth Zyvox and doxycycline


- Urology did incision and drainage on his previous admission


DVT prophylaxis


- SCDs


Disposition


- Admit to medical floor

## 2018-01-26 NOTE — EMERGENCY DEPARTMENT REPORT
HPI





- General


Chief Complaint: Urogenital-Male


Time Seen by Provider: 01/26/18 02:01





ED Past Medical Hx





- Past Medical History


Previous Medical History?: No


Hx Hypertension: No


Hx Congestive Heart Failure: No


Hx Diabetes: No


Hx Asthma: No


Hx COPD: No


Hx HIV: No





- Surgical History


Past Surgical History?: Yes


Additional Surgical History: Jaw reconstruction, penile cyst surgery





- Social History


Smoking Status: Current Every Day Smoker





- Medications


Home Medications: 


 Home Medications











 Medication  Instructions  Recorded  Confirmed  Last Taken  Type


 


Doxycycline Hyclate [Vibramycin] 100 mg PO DAILY #10 capsule 01/25/18  Unknown 

Rx


 


Linezolid [Zyvox] 600 mg PO BID #28 tablet 01/25/18  Unknown Rx


 


oxyCODONE /ACETAMINOPHEN [Percocet 1 tab PO Q4H PRN #12 tablet 01/25/18  

Unknown Rx





5/325 mg]     


 


Doxycycline [Vibramycin CAP] 100 mg PO BID  capsule 01/26/18  Unknown Rx


 


Folic Acid [Folvite] 1 mg PO QDAY #30 tablet 01/26/18  Unknown Rx


 


Linezolid [Zyvox] 600 mg PO Q12HR  tablet 01/26/18  Unknown Rx














ED Review of Systems


ROS: 


Stated complaint: PENILE PAIN


Other details as noted in HPI








Physical Exam





- Physical Exam


Vital Signs: 


 Vital Signs











  01/26/18 01/26/18 01/26/18





  01:43 02:45 04:00


 


Temperature 97.4 F L  


 


Pulse Rate 70  71


 


Respiratory 18 16 12





Rate   


 


Blood Pressure 118/68  121/58





[Right]   


 


O2 Sat by Pulse 97  





Oximetry   














  01/26/18 01/26/18 01/26/18





  04:14 04:20 04:44


 


Temperature   


 


Pulse Rate   


 


Respiratory 16 12 16





Rate   


 


Blood Pressure   





[Right]   


 


O2 Sat by Pulse  96 





Oximetry   














  01/26/18 01/26/18





  06:00 08:00


 


Temperature 97.8 F 97.2 F L


 


Pulse Rate 70 80


 


Respiratory 18 16





Rate  


 


Blood Pressure 124/62 120/60





[Right]  


 


O2 Sat by Pulse 100 100





Oximetry  














ED Course


 Vital Signs











  01/26/18 01/26/18 01/26/18





  01:43 02:45 04:00


 


Temperature 97.4 F L  


 


Pulse Rate 70  71


 


Respiratory 18 16 12





Rate   


 


Blood Pressure 118/68  121/58





[Right]   


 


O2 Sat by Pulse 97  





Oximetry   














  01/26/18 01/26/18 01/26/18





  04:14 04:20 04:44


 


Temperature   


 


Pulse Rate   


 


Respiratory 16 12 16





Rate   


 


Blood Pressure   





[Right]   


 


O2 Sat by Pulse  96 





Oximetry   














  01/26/18 01/26/18





  06:00 08:00


 


Temperature 97.8 F 97.2 F L


 


Pulse Rate 70 80


 


Respiratory 18 16





Rate  


 


Blood Pressure 124/62 120/60





[Right]  


 


O2 Sat by Pulse 100 100





Oximetry  














ED Medical Decision Making





- Lab Data


Result diagrams: 


 01/26/18 07:55





 01/26/18 02:10





- Medical Decision Making


Mr. Oviedo presents to the ED for complication related to recent penile surgery.

  He underwent surgery for penile abscess.  The drain partially removed.  He 

was concerned for potential complication.  He came to the ED.  He was evaluated 

by urologist Dr. Mo.  Dr. Mo recommended discharge after he removed 

the drain.  Hospitalist approved discharge.  I provided 1 dose of analgesia in 

the ED and arranged for ER discharge.  Please see full H&P by my colleague for 

further information.





Critical care attestation.: 


If time is entered above; I have spent that time in minutes in the direct care 

of this critically ill patient, excluding procedure time.








ED Disposition


Clinical Impression: 


 Penile abscess, Post-op pain





Disposition: DC-01 TO HOME OR SELFCARE


Is pt being admited?: No


Does the pt Need Aspirin: No


Condition: Stable

## 2018-01-26 NOTE — DISCHARGE SUMMARY
Providers





- Providers


Date of Admission: 


01/26/18 03:42





Date of discharge: 01/26/18


Attending physician: 


MOUNA SHAFER





 





01/26/18 03:06


Consult to Physician [CONS] Urgent 


   Consulting Provider: PENNY GUERRA


   Reason For Exam: penile abscess s/p i and d


   Place consult to:: Dr. Guerra


   Notified:: via their office number


   Phone number called:: [phone]


   Was contact made?: Yes


   If yes, spoke with:: Dr. Guerra


   Time called:: 03:03


   Comment:: Dr. Snyder (er dr) spoke with Dr. Guerra





01/26/18 03:43


Consult to Physician [CONS] Routine 


   Consulting Provider: MARS LITTLE


   Reason For Exam: anemia


   Place consult to:: Dr. Little


   Notified:: Via his phone


   Phone number called:: his number


   Was contact made?: Yes


   If yes, spoke with:: Dr. Little


   Time called:: 03:24


   Comment:: Dr. Snyder (er dr) spoke with Dr. Little











Primary care physician: 


NATASHA CARABALLO








Hospitalization


Condition: Stable


Hospital course: 





47 year old -American male with past medical history significant for 

penile abscess status post incision and drainage of this abscess, 

methamphetamine use was discharged yesterday from the hospital after he was 

admitted for sepsis, pain and abscess.  He was treated with IV antibiotics and 

given prescription for by mouth Zyvox and doxycycline but he couldn't refill 

his medications.  Urology was consulted and did drainage of abscess.  Patient 

has a draining tube left on the abscess site and he felt a flap and both sides 

of the draining tube are on the outside and patient presented back to the ED. 





Patient was re-evaluated in ER by urologist, Dr Whaley who removed the 

surgical drain and  reassured pt that wound looked ok and will continue to heal 

with time.  Patient was found to have anemia with hemoglobin of 7.2 and folate 

deficiency.  Repeat CBC showed hemoglobin of 11.8 in addition patient is a 

Jehovah witness and denied blood transfusion.  Patient was discharged with 

folic acid.





Discharge diagnosis





Folate deficiency-likely due to poor by mouth intake


Status post I&D of penile abscess














Disposition: DC-01 TO HOME OR SELFCARE





Core Measure Documentation





- Palliative Care


Palliative Care/ Comfort Measures: Not Applicable





- Core Measures


Any of the following diagnoses?: none





Exam





- Constitutional


Vitals: 


 











Temp Pulse Resp BP Pulse Ox


 


 97.2 F L  80   16   120/60   100 


 


 01/26/18 08:00  01/26/18 08:00  01/26/18 08:00  01/26/18 08:00  01/26/18 08:00











General appearance: Present: no acute distress, well-nourished





- EENT


Eyes: Present: PERRL


ENT: hearing intact, clear oral mucosa





- Neck


Neck: Present: supple, normal ROM





- Respiratory


Respiratory effort: normal


Respiratory: bilateral: CTA





- Cardiovascular


Heart Sounds: Present: S1 & S2.  Absent: rub, click





- Extremities


Extremities: pulses symmetrical, No edema


Peripheral Pulses: within normal limits





- Abdominal


General gastrointestinal: Present: soft, non-tender, non-distended, normal 

bowel sounds


Male genitourinary: Present: tender (surgical scar present)





- Rectal


Rectal Exam: deferred





- Integumentary


Integumentary: Present: clear, warm, dry





- Musculoskeletal


Musculoskeletal: gait normal, strength equal bilaterally





- Psychiatric


Psychiatric: appropriate mood/affect, intact judgment & insight





- Neurologic


Neurologic: CNII-XII intact, moves all extremities





- Allied Health


Allied health notes reviewed: nursing





Plan


Activity: no restrictions


Weight Bearing Status: Weight Bear as Tolerated


Diet: regular


Wound: per your surgeon's advice


Follow up with: 


NATASHA CARABALLO MD [Primary Care Provider] - 7 Days


Prescriptions: 


Folic Acid [Folvite] 1 mg PO QDAY #30 tablet

## 2018-01-26 NOTE — EMERGENCY DEPARTMENT REPORT
ED Male  HPI





- General


Chief complaint: Urogenital-Male


Stated complaint: PENILE PAIN


Time Seen by Provider: 01/26/18 02:01


Source: EMS


Mode of arrival: Ambulatory


Limitations: No Limitations





- History of Present Illness


Initial comments: 





47-year-old male male with a past medical history of methamphetamine abuse and 

recent surgery for penile abscess presents to the hospital complaints of 

opening of the now postsurgical wound.  Patient was admitted here the 25th for 

sepsis, penile abscess, and had a I&D performed by urology with surgical drain 

placed.  Patient was discharged on Zyvox and doxycycline.  Patient was able to 

get doxycycline filled with the pharmacist did not have Zyvox available.  

Social 30 a.m. patient woke up to use a bedside urinal and noticed that he was 

able to see the distal part of the wound drain and opening of the sutures to 

his penis.  Pain reported to be 8/10 in intensity and worse with palpation.





- Related Data


 Previous Rx's











 Medication  Instructions  Recorded  Last Taken  Type


 


Doxycycline Hyclate [Vibramycin] 100 mg PO DAILY #10 capsule 01/25/18 Unknown Rx


 


Linezolid [Zyvox] 600 mg PO BID #28 tablet 01/25/18 Unknown Rx


 


oxyCODONE /ACETAMINOPHEN [Percocet 1 tab PO Q4H PRN #12 tablet 01/25/18 Unknown 

Rx





5/325 mg]    











 Allergies











Allergy/AdvReac Type Severity Reaction Status Date / Time


 


Penicillins Allergy  Hives Verified 12/17/13 22:28














ED Review of Systems


ROS: 


Stated complaint: PENILE PAIN


Other details as noted in HPI





Comment: All other systems reviewed and negative


Other: 





Constitutional: No fevers chills


Eyes: No eye pain visual changes 


ENT: No ear pain or throat pain


Neck: Denies pain


Respiratory: Denies cough wheezing shortness of breath


Cardiovascular: Denies chest pain, palpitations, syncope


GI: Denies abdominal pain, nausea, vomiting, diarrhea


: As in HPI


Musculoskeletal: Denies back pain


Skin: Denies rash, lesions, erythema


Neurologic: Denies headache, numbness, weakness


Psychiatric: Denies suicidal ideation, hallucinations








ED Past Medical Hx





- Past Medical History


Previous Medical History?: No


Hx Hypertension: No


Hx Congestive Heart Failure: No


Hx Diabetes: No


Hx Asthma: No


Hx COPD: No


Hx HIV: No





- Surgical History


Past Surgical History?: Yes


Additional Surgical History: Jaw reconstruction, penile cyst surgery





- Social History


Smoking Status: Current Every Day Smoker





- Medications


Home Medications: 


 Home Medications











 Medication  Instructions  Recorded  Confirmed  Last Taken  Type


 


Doxycycline Hyclate [Vibramycin] 100 mg PO DAILY #10 capsule 01/25/18  Unknown 

Rx


 


Linezolid [Zyvox] 600 mg PO BID #28 tablet 01/25/18  Unknown Rx


 


oxyCODONE /ACETAMINOPHEN [Percocet 1 tab PO Q4H PRN #12 tablet 01/25/18  

Unknown Rx





5/325 mg]     














ED Physical Exam





- General


Limitations: No Limitations





- Other


Other exam information: 





General: No limitations, patient is alert in no acute distress


Head exam: Atraumatic, normocephalic


Eyes exam: Normal appearance


ENT: Moist mucous membrane, normal oropharynx


Neck exam: Normal inspection, full range of motion


Respiratory exam: Clear to auscultation bilateral, no wheezes, rales, crackles


Cardiovascular: Normal rate and rhythm, normal heart sounds


Abdomen: Soft, nondistended, and  nontender, with normal bowel sounds, no 

rebound, or guarding


: Patient is circumcised, Proximal penile drain noted to the right side of 

the penis, distant opening noted with dehiscence of wound with sutures noted 

and distal portion of the penile drainage is noted.


Extremity: Full range of motion normal inspection no deformity


Back: Normal Inspection, full range of motion, no tenderness


Neurologic: Alert, oriented x3, cranial nerves intact, no motor or sensory 

deficit


Psychiatric: normal affect, normal mood


Skin: Warm, dry, intact





ED Course


 Vital Signs











  01/26/18





  01:43


 


Temperature 97.4 F L


 


Pulse Rate 70


 


Respiratory 18





Rate 


 


Blood Pressure 118/68





[Right] 


 


O2 Sat by Pulse 97





Oximetry 














- Consultations


Consultation #1: 





01/26/18 03:06


Case discussed with urologist Dr. Guerra.  Will evaluate patient during 

admission








ED Medical Decision Making





- Lab Data


Result diagrams: 


 01/26/18 02:10





 01/26/18 02:10








 Lab Results











  01/26/18 01/26/18 Range/Units





  02:10 02:10 


 


WBC  10.4   (4.5-11.0)  K/mm3


 


RBC  2.26 L   (3.65-5.03)  M/mm3


 


Hgb  7.2 L D   (11.8-15.2)  gm/dl


 


Hct  20.5 L D   (35.5-45.6)  %


 


MCV  91   (84-94)  fl


 


MCH  32   (28-32)  pg


 


MCHC  35 H   (32-34)  %


 


RDW  13.5   (13.2-15.2)  %


 


Plt Count  469 H D   (140-440)  K/mm3


 


Lymph % (Auto)  31.8   (13.4-35.0)  %


 


Mono % (Auto)  11.6 H   (0.0-7.3)  %


 


Eos % (Auto)  3.4   (0.0-4.3)  %


 


Baso % (Auto)  1.1   (0.0-1.8)  %


 


Lymph #  3.3   (1.2-5.4)  K/mm3


 


Mono #  1.2 H   (0.0-0.8)  K/mm3


 


Eos #  0.4   (0.0-0.4)  K/mm3


 


Baso #  0.1   (0.0-0.1)  K/mm3


 


Seg Neutrophils %  52.1   (40.0-70.0)  %


 


Seg Neutrophils #  5.4   (1.8-7.7)  K/mm3


 


Sodium   140  (137-145)  mmol/L


 


Potassium   4.5  D  (3.6-5.0)  mmol/L


 


Chloride   101.2  ()  mmol/L


 


Carbon Dioxide   27  (22-30)  mmol/L


 


Anion Gap   16  mmol/L


 


BUN   8 L  (9-20)  mg/dL


 


Creatinine   0.7 L  (0.8-1.5)  mg/dL


 


Estimated GFR   > 60  ml/min


 


BUN/Creatinine Ratio   11  %


 


Glucose   81  ()  mg/dL


 


Calcium   8.5  (8.4-10.2)  mg/dL














- Medical Decision Making





Penile wound dehiscence


Urologist informed


Will evaluate patient in the hospital





Anemia


Significant drop compared to the 23rd


Pt is Jehovah witness and does not want blood products


Patient will be admitted to monitor H&H to ensure stability prior to discharge


Patient denies melena or hematochezia





- Differential Diagnosis


wound dehiscence, penile abscess,


Critical Care Time: No


Critical care attestation.: 


If time is entered above; I have spent that time in minutes in the direct care 

of this critically ill patient, excluding procedure time.








ED Disposition


Clinical Impression: 


 Wound, open, penis, Postoperative wound dehiscence, Anemia





Disposition: DC-09 OP ADMIT IP TO THIS HOSP


Is pt being admited?: Yes


Condition: Stable


Time of Disposition: 03:41 (Dr. Bellamy/hosp)

## 2018-02-19 NOTE — PROGRESS NOTE
Assessment and Plan





no more pus 


drain removed 


looks fine 


local care 





Subjective


Date of service: 01/26/18


Principal diagnosis: penile abcess 





Objective





- Constitutional


Vitals: 


 Vital Signs - 12hr











  01/26/18 01/26/18 01/26/18





  01:43 02:45 04:00


 


Temperature 97.4 F L  


 


Pulse Rate 70  71


 


Respiratory 18 16 12





Rate   


 


Blood Pressure 118/68  121/58





[Right]   


 


O2 Sat by Pulse 97  





Oximetry   














  01/26/18 01/26/18 01/26/18





  04:14 04:20 04:44


 


Temperature   


 


Pulse Rate   


 


Respiratory 16 12 16





Rate   


 


Blood Pressure   





[Right]   


 


O2 Sat by Pulse  96 





Oximetry   











General appearance: Present: no acute distress





- Respiratory


Respiratory effort: normal





- Gastrointestinal


General gastrointestinal: Present: non-tender





- Genitourinary


Male genitourinary: asymmetrical (markedly improved )





- Labs


CBC & Chem 7: 


 01/26/18 07:55





 01/26/18 02:10


Labs: 


 Abnormal lab results











  01/26/18 01/26/18 01/26/18 Range/Units





  02:10 02:10 07:04 


 


RBC  2.26 L    (3.65-5.03)  M/mm3


 


Hgb  7.2 L D    (11.8-15.2)  gm/dl


 


Hct  20.5 L D    (35.5-45.6)  %


 


MCHC  35 H    (32-34)  %


 


Plt Count  469 H D    (140-440)  K/mm3


 


Lymph % (Auto)     (13.4-35.0)  %


 


Mono % (Auto)  11.6 H    (0.0-7.3)  %


 


Mono #  1.2 H    (0.0-0.8)  K/mm3


 


BUN   8 L   (9-20)  mg/dL


 


Creatinine   0.7 L   (0.8-1.5)  mg/dL


 


TIBC    188 L  (250-450)  mcg/dL


 


Folate     (7.3-26.0)  ng/mL














  01/26/18 01/26/18 Range/Units





  07:04 07:55 


 


RBC    (3.65-5.03)  M/mm3


 


Hgb    (11.8-15.2)  gm/dl


 


Hct   34.9 L D  (35.5-45.6)  %


 


MCHC    (32-34)  %


 


Plt Count    (140-440)  K/mm3


 


Lymph % (Auto)   35.4 H  (13.4-35.0)  %


 


Mono % (Auto)   12.2 H  (0.0-7.3)  %


 


Mono #    (0.0-0.8)  K/mm3


 


BUN    (9-20)  mg/dL


 


Creatinine    (0.8-1.5)  mg/dL


 


TIBC    (250-450)  mcg/dL


 


Folate  6.27 L   (7.3-26.0)  ng/mL 0